# Patient Record
Sex: FEMALE | Race: WHITE | NOT HISPANIC OR LATINO | URBAN - METROPOLITAN AREA
[De-identification: names, ages, dates, MRNs, and addresses within clinical notes are randomized per-mention and may not be internally consistent; named-entity substitution may affect disease eponyms.]

---

## 2019-10-16 ENCOUNTER — EMERGENCY (EMERGENCY)
Facility: HOSPITAL | Age: 69
LOS: 1 days | Discharge: HOME | End: 2019-10-16
Attending: EMERGENCY MEDICINE | Admitting: EMERGENCY MEDICINE
Payer: MEDICARE

## 2019-10-16 VITALS
RESPIRATION RATE: 16 BRPM | TEMPERATURE: 98 F | HEART RATE: 75 BPM | DIASTOLIC BLOOD PRESSURE: 75 MMHG | OXYGEN SATURATION: 100 % | SYSTOLIC BLOOD PRESSURE: 126 MMHG

## 2019-10-16 PROCEDURE — 99283 EMERGENCY DEPT VISIT LOW MDM: CPT

## 2019-10-16 RX ADMIN — Medication 200 MILLIGRAM(S): at 13:18

## 2019-10-16 NOTE — ED ADULT TRIAGE NOTE - CHIEF COMPLAINT QUOTE
pt c/o tick bite to left chest area. states she removed the tick this morning. denies HA/fever/chills. denies pain and discomfort.

## 2019-10-16 NOTE — ED PROVIDER NOTE - PATIENT PORTAL LINK FT
You can access the FollowMyHealth Patient Portal offered by [facility] by registering at the following website: [email or web address]. By joining [employer]’s FollowMyHealth portal, you will also be able to view your health information using other applications (apps) compatible with our system.

## 2019-10-16 NOTE — ED PROVIDER NOTE - OBJECTIVE STATEMENT
Pt is a 68 y/o F with PMHx of HTN who presents to the ED c/o of a tick bite. She states she pulled a tick off her left axillary/upper breast area earlier this morning. Pt believes the tick was brought in 2 days ago by her dog. JOANA.

## 2019-10-16 NOTE — ED PROVIDER NOTE - CLINICAL SUMMARY MEDICAL DECISION MAKING FREE TEXT BOX
Pt is a 68 y/o F who presents to the ED with a tick bite to her left lateral pectoral area. Plan for 200 mg dose of doxycycline and Lyme prophylaxis. Pt is a 70 y/o F who presents to the ED with a tick bite to her left lateral pectoral area. Plan for 200 mg dose of doxycycline for Lyme prophylaxis.

## 2021-11-20 ENCOUNTER — EMERGENCY (EMERGENCY)
Facility: HOSPITAL | Age: 71
LOS: 1 days | Discharge: HOME | End: 2021-11-20
Attending: EMERGENCY MEDICINE | Admitting: EMERGENCY MEDICINE
Payer: MEDICARE

## 2021-11-20 VITALS
TEMPERATURE: 97 F | HEART RATE: 79 BPM | RESPIRATION RATE: 16 BRPM | HEIGHT: 66 IN | SYSTOLIC BLOOD PRESSURE: 166 MMHG | OXYGEN SATURATION: 100 % | DIASTOLIC BLOOD PRESSURE: 78 MMHG

## 2021-11-20 PROCEDURE — 73610 X-RAY EXAM OF ANKLE: CPT | Mod: 26,LT

## 2021-11-20 PROCEDURE — 99284 EMERGENCY DEPT VISIT MOD MDM: CPT

## 2021-11-20 PROCEDURE — 73630 X-RAY EXAM OF FOOT: CPT | Mod: 26,LT

## 2021-11-20 RX ORDER — IBUPROFEN 200 MG
600 TABLET ORAL ONCE
Refills: 0 | Status: COMPLETED | OUTPATIENT
Start: 2021-11-20 | End: 2021-11-20

## 2021-11-20 RX ORDER — ACETAMINOPHEN 500 MG
650 TABLET ORAL ONCE
Refills: 0 | Status: COMPLETED | OUTPATIENT
Start: 2021-11-20 | End: 2021-11-20

## 2021-11-20 RX ADMIN — Medication 650 MILLIGRAM(S): at 21:16

## 2021-11-20 RX ADMIN — Medication 600 MILLIGRAM(S): at 21:25

## 2021-11-20 NOTE — ED PROVIDER NOTE - PATIENT PORTAL LINK FT
You can access the FollowMyHealth Patient Portal offered by [facility] by registering at the following website: [email or web address]. By joining [employer]’s FollowMyHealth portal, you will also be able to view your health information using other applications (apps) compatible with our system.

## 2021-11-20 NOTE — ED PROVIDER NOTE - NSFOLLOWUPINSTRUCTIONS_ED_ALL_ED_FT
Your diagnosis:    Discharge instructions:    - Please follow up with your Primary Care Doctor within the next week.    - We recommend you stay off your feet to reduce inflammation and when you are sitting/laying down, we recommend elevating and icing your toe/foot.     - Tylenol up to 650 mg every 8 hours as needed for pain and/or Motrin up to 600 mg every 6 hours as needed for pain. Take any prescribed medications as instructed:       SEEK IMMEDIATE MEDICAL CARE IF YOU HAVE ANY OF THE FOLLOWING SYMPTOMS: numbness, tingling, increasing pain, or weakness in any part of the foot.

## 2021-11-20 NOTE — ED PROVIDER NOTE - OBJECTIVE STATEMENT
72yo F coming in after a fall. Patient was picking up large box of dog food and spun too quickly, causing patient to lose balance. Patient did not feel dizzy, lightheaded, diaphoretic or had associated chest pain or SOB prior to or after the event. Denied LOC or head injury. Was able to ambulate immediately afterward + walked her dog. Took 1g tylenol at 2pm. Coming in for persistent left toe pain + swelling.

## 2021-11-20 NOTE — ED PROVIDER NOTE - PHYSICAL EXAMINATION
GENERAL: well appearing in no acute distress, non-toxic appearing  HEAD: normocephalic, atraumatic  HENT: airway intact, neck supple  EYES: normal conjunctiva  CARDIAC: regular rate and rhythm, normal S1S2, no appreciable murmurs, 2+ pulses in UE/LE b/l  PULM: normal breath sounds, clear to ascultation bilaterally, no rales, rhonchi, wheezing  GI: abdomen nondistended, soft, nontender, no guarding, rebound tenderness  NEURO: no focal motor or sensory deficits  MSK: left foot swollen w/ ecchymoses over left toe; neurovascularly intact b/l; FROM left foot + toes.   SKIN: well-perfused, extremities warm, no visible rashes

## 2021-11-20 NOTE — ED ADULT TRIAGE NOTE - CHIEF COMPLAINT QUOTE
Pt states that she was lifting a 30lb bag of dog food  and ad a syncopal episode, pt c/o pain to left foot as a result of falling to the ground.  Pt denies head strike.  PMh hashimotos

## 2021-11-20 NOTE — ED PROVIDER NOTE - CLINICAL SUMMARY MEDICAL DECISION MAKING FREE TEXT BOX
72yo F coming in after a fall; no concern for syncope given nature of event. Will screen for fracture Ani att: 72 yo F coming in after a fall; no concern for syncope given nature of event. Will screen for fracture

## 2021-11-20 NOTE — ED ADULT NURSE NOTE - OBJECTIVE STATEMENT
Pt states that she was lifting a 30lb bag of dog food  and ad a syncopal episode, pt c/o pain to left foot as a result of falling to the ground.  Pt denies head strike.  PMh hashimotos  Patient brought to room 26. Pt is alert and oriented times three. Pt is waiting for evaluation by MD. Dana RN

## 2021-11-20 NOTE — ED PROVIDER NOTE - NS ED ROS FT
General: denies fever, chills  HENT: denies nasal congestion, rhinorrhea  Eyes: denies visual changes, blurred vision  CV: denies chest pain, palpitations  Resp: denies difficulty breathing, cough  Abdominal: denies nausea, vomiting, diarrhea, abdominal pain  : denies urinary pain or discharge  MSK: left foot pain + toe pain  Neuro: denies headaches, numbness, tingling  Skin: denies rashes, bruises

## 2023-07-12 ENCOUNTER — EMERGENCY (EMERGENCY)
Facility: HOSPITAL | Age: 73
LOS: 1 days | Discharge: HOME | End: 2023-07-12
Attending: EMERGENCY MEDICINE | Admitting: EMERGENCY MEDICINE
Payer: MEDICARE

## 2023-07-12 VITALS
RESPIRATION RATE: 16 BRPM | HEART RATE: 79 BPM | TEMPERATURE: 99 F | SYSTOLIC BLOOD PRESSURE: 149 MMHG | OXYGEN SATURATION: 100 % | DIASTOLIC BLOOD PRESSURE: 82 MMHG

## 2023-07-12 VITALS
OXYGEN SATURATION: 97 % | HEART RATE: 71 BPM | RESPIRATION RATE: 17 BRPM | SYSTOLIC BLOOD PRESSURE: 146 MMHG | DIASTOLIC BLOOD PRESSURE: 87 MMHG | TEMPERATURE: 98 F

## 2023-07-12 LAB
ALBUMIN SERPL ELPH-MCNC: 4.6 G/DL — SIGNIFICANT CHANGE UP (ref 3.3–5)
ALP SERPL-CCNC: 62 U/L — SIGNIFICANT CHANGE UP (ref 40–120)
ALT FLD-CCNC: 23 U/L — SIGNIFICANT CHANGE UP (ref 4–33)
ANION GAP SERPL CALC-SCNC: 10 MMOL/L — SIGNIFICANT CHANGE UP (ref 7–14)
APPEARANCE UR: CLEAR — SIGNIFICANT CHANGE UP
APTT BLD: 35.9 SEC — SIGNIFICANT CHANGE UP (ref 27–36.3)
AST SERPL-CCNC: 19 U/L — SIGNIFICANT CHANGE UP (ref 4–32)
BACTERIA # UR AUTO: NEGATIVE /HPF — SIGNIFICANT CHANGE UP
BASE EXCESS BLDV CALC-SCNC: 4.1 MMOL/L — HIGH (ref -2–3)
BASOPHILS # BLD AUTO: 0.02 K/UL — SIGNIFICANT CHANGE UP (ref 0–0.2)
BASOPHILS NFR BLD AUTO: 0.3 % — SIGNIFICANT CHANGE UP (ref 0–2)
BILIRUB SERPL-MCNC: 0.6 MG/DL — SIGNIFICANT CHANGE UP (ref 0.2–1.2)
BILIRUB UR-MCNC: NEGATIVE — SIGNIFICANT CHANGE UP
BLOOD GAS VENOUS COMPREHENSIVE RESULT: SIGNIFICANT CHANGE UP
BUN SERPL-MCNC: 18 MG/DL — SIGNIFICANT CHANGE UP (ref 7–23)
CALCIUM SERPL-MCNC: 9.6 MG/DL — SIGNIFICANT CHANGE UP (ref 8.4–10.5)
CAST: 0 /LPF — SIGNIFICANT CHANGE UP (ref 0–4)
CHLORIDE BLDV-SCNC: 103 MMOL/L — SIGNIFICANT CHANGE UP (ref 96–108)
CHLORIDE SERPL-SCNC: 103 MMOL/L — SIGNIFICANT CHANGE UP (ref 98–107)
CO2 BLDV-SCNC: 31.8 MMOL/L — HIGH (ref 22–26)
CO2 SERPL-SCNC: 26 MMOL/L — SIGNIFICANT CHANGE UP (ref 22–31)
COLOR SPEC: YELLOW — SIGNIFICANT CHANGE UP
CREAT SERPL-MCNC: 0.79 MG/DL — SIGNIFICANT CHANGE UP (ref 0.5–1.3)
DIFF PNL FLD: NEGATIVE — SIGNIFICANT CHANGE UP
EGFR: 78 ML/MIN/1.73M2 — SIGNIFICANT CHANGE UP
EOSINOPHIL # BLD AUTO: 0.19 K/UL — SIGNIFICANT CHANGE UP (ref 0–0.5)
EOSINOPHIL NFR BLD AUTO: 2.9 % — SIGNIFICANT CHANGE UP (ref 0–6)
GAS PNL BLDV: 134 MMOL/L — LOW (ref 136–145)
GAS PNL BLDV: SIGNIFICANT CHANGE UP
GLUCOSE BLDV-MCNC: 96 MG/DL — SIGNIFICANT CHANGE UP (ref 70–99)
GLUCOSE SERPL-MCNC: 131 MG/DL — HIGH (ref 70–99)
GLUCOSE UR QL: >=1000 MG/DL
HCO3 BLDV-SCNC: 30 MMOL/L — HIGH (ref 22–29)
HCT VFR BLD CALC: 45.1 % — HIGH (ref 34.5–45)
HCT VFR BLDA CALC: 44 % — SIGNIFICANT CHANGE UP (ref 34.5–46.5)
HGB BLD CALC-MCNC: 14.6 G/DL — SIGNIFICANT CHANGE UP (ref 11.7–16.1)
HGB BLD-MCNC: 14.3 G/DL — SIGNIFICANT CHANGE UP (ref 11.5–15.5)
IANC: 4.28 K/UL — SIGNIFICANT CHANGE UP (ref 1.8–7.4)
IMM GRANULOCYTES NFR BLD AUTO: 0.3 % — SIGNIFICANT CHANGE UP (ref 0–0.9)
INR BLD: 1 RATIO — SIGNIFICANT CHANGE UP (ref 0.88–1.16)
KETONES UR-MCNC: NEGATIVE MG/DL — SIGNIFICANT CHANGE UP
LACTATE BLDV-MCNC: 1.2 MMOL/L — SIGNIFICANT CHANGE UP (ref 0.5–2)
LEUKOCYTE ESTERASE UR-ACNC: ABNORMAL
LYMPHOCYTES # BLD AUTO: 1.69 K/UL — SIGNIFICANT CHANGE UP (ref 1–3.3)
LYMPHOCYTES # BLD AUTO: 25.6 % — SIGNIFICANT CHANGE UP (ref 13–44)
MCHC RBC-ENTMCNC: 28.9 PG — SIGNIFICANT CHANGE UP (ref 27–34)
MCHC RBC-ENTMCNC: 31.7 GM/DL — LOW (ref 32–36)
MCV RBC AUTO: 91.3 FL — SIGNIFICANT CHANGE UP (ref 80–100)
MONOCYTES # BLD AUTO: 0.41 K/UL — SIGNIFICANT CHANGE UP (ref 0–0.9)
MONOCYTES NFR BLD AUTO: 6.2 % — SIGNIFICANT CHANGE UP (ref 2–14)
NEUTROPHILS # BLD AUTO: 4.28 K/UL — SIGNIFICANT CHANGE UP (ref 1.8–7.4)
NEUTROPHILS NFR BLD AUTO: 64.7 % — SIGNIFICANT CHANGE UP (ref 43–77)
NITRITE UR-MCNC: NEGATIVE — SIGNIFICANT CHANGE UP
NRBC # BLD: 0 /100 WBCS — SIGNIFICANT CHANGE UP (ref 0–0)
NRBC # FLD: 0 K/UL — SIGNIFICANT CHANGE UP (ref 0–0)
PCO2 BLDV: 50 MMHG — SIGNIFICANT CHANGE UP (ref 39–52)
PH BLDV: 7.39 — SIGNIFICANT CHANGE UP (ref 7.32–7.43)
PH UR: 6 — SIGNIFICANT CHANGE UP (ref 5–8)
PLATELET # BLD AUTO: 278 K/UL — SIGNIFICANT CHANGE UP (ref 150–400)
PO2 BLDV: <20 MMHG — LOW (ref 25–45)
POTASSIUM BLDV-SCNC: 4.3 MMOL/L — SIGNIFICANT CHANGE UP (ref 3.5–5.1)
POTASSIUM SERPL-MCNC: 4 MMOL/L — SIGNIFICANT CHANGE UP (ref 3.5–5.3)
POTASSIUM SERPL-SCNC: 4 MMOL/L — SIGNIFICANT CHANGE UP (ref 3.5–5.3)
PROT SERPL-MCNC: 7.5 G/DL — SIGNIFICANT CHANGE UP (ref 6–8.3)
PROT UR-MCNC: NEGATIVE MG/DL — SIGNIFICANT CHANGE UP
PROTHROM AB SERPL-ACNC: 11.6 SEC — SIGNIFICANT CHANGE UP (ref 10.5–13.4)
RBC # BLD: 4.94 M/UL — SIGNIFICANT CHANGE UP (ref 3.8–5.2)
RBC # FLD: 12.3 % — SIGNIFICANT CHANGE UP (ref 10.3–14.5)
RBC CASTS # UR COMP ASSIST: 0 /HPF — SIGNIFICANT CHANGE UP (ref 0–4)
SAO2 % BLDV: 15.7 % — LOW (ref 67–88)
SODIUM SERPL-SCNC: 139 MMOL/L — SIGNIFICANT CHANGE UP (ref 135–145)
SP GR SPEC: 1.05 — HIGH (ref 1–1.03)
SQUAMOUS # UR AUTO: 1 /HPF — SIGNIFICANT CHANGE UP (ref 0–5)
TROPONIN T, HIGH SENSITIVITY RESULT: 8 NG/L — SIGNIFICANT CHANGE UP
TROPONIN T, HIGH SENSITIVITY RESULT: 8 NG/L — SIGNIFICANT CHANGE UP
UROBILINOGEN FLD QL: 0.2 MG/DL — SIGNIFICANT CHANGE UP (ref 0.2–1)
WBC # BLD: 6.61 K/UL — SIGNIFICANT CHANGE UP (ref 3.8–10.5)
WBC # FLD AUTO: 6.61 K/UL — SIGNIFICANT CHANGE UP (ref 3.8–10.5)
WBC UR QL: 14 /HPF — HIGH (ref 0–5)

## 2023-07-12 PROCEDURE — 71045 X-RAY EXAM CHEST 1 VIEW: CPT | Mod: 26

## 2023-07-12 PROCEDURE — 0042T: CPT

## 2023-07-12 PROCEDURE — 99285 EMERGENCY DEPT VISIT HI MDM: CPT

## 2023-07-12 PROCEDURE — 70496 CT ANGIOGRAPHY HEAD: CPT | Mod: 26,MA

## 2023-07-12 PROCEDURE — 70498 CT ANGIOGRAPHY NECK: CPT | Mod: 26,MA

## 2023-07-12 PROCEDURE — 93010 ELECTROCARDIOGRAM REPORT: CPT

## 2023-07-12 NOTE — ED ADULT NURSE NOTE - OBJECTIVE STATEMENT
patient is awake and alert, states having a left sided facial droop this morning that has since resolved.  Patient was a code stroke, Passes dysphagia screening.  No weakness or slurred speech noted.

## 2023-07-12 NOTE — ED PROVIDER NOTE - CARDIAC, MLM
Problem: PAIN - ADULT  Goal: Verbalizes/displays adequate comfort level or baseline comfort level  Description: Interventions:  - Encourage patient to monitor pain and request assistance  - Assess pain using appropriate pain scale  - Administer analgesics based on type and severity of pain and evaluate response  - Implement non-pharmacological measures as appropriate and evaluate response  - Consider cultural and social influences on pain and pain management  - Notify physician/advanced practitioner if interventions unsuccessful or patient reports new pain  Outcome: Progressing     Problem: SAFETY ADULT  Goal: Patient will remain free of falls  Description: INTERVENTIONS:  - Educate patient/family on patient safety including physical limitations  - Instruct patient to call for assistance with activity   - Consult OT/PT to assist with strengthening/mobility   - Keep Call bell within reach  - Keep bed low and locked with side rails adjusted as appropriate  - Keep care items and personal belongings within reach  - Initiate and maintain comfort rounds    - Apply yellow socks and bracelet for high fall risk patients  - Consider moving patient to room near nurses station  Outcome: Progressing     Problem: DISCHARGE PLANNING  Goal: Discharge to home or other facility with appropriate resources  Description: INTERVENTIONS:  - Identify barriers to discharge w/patient and caregiver  - Arrange for needed discharge resources and transportation as appropriate  - Identify discharge learning needs (meds, wound care, etc )  - Arrange for interpretive services to assist at discharge as needed  - Refer to Case Management Department for coordinating discharge planning if the patient needs post-hospital services based on physician/advanced practitioner order or complex needs related to functional status, cognitive ability, or social support system  Outcome: Progressing     Problem: Knowledge Deficit  Goal: Patient/family/caregiver demonstrates understanding of disease process, treatment plan, medications, and discharge instructions  Description: Complete learning assessment and assess knowledge base    Interventions:  - Provide teaching at level of understanding  - Provide teaching via preferred learning methods  Outcome: Progressing Normal rate, regular rhythm.  Heart sounds S1, S2.  No murmurs, rubs or gallops.

## 2023-07-12 NOTE — ED PROVIDER NOTE - PATIENT PORTAL LINK FT
You can access the FollowMyHealth Patient Portal offered by [facility] by registering at the following website: [email or web address]. By joining [employer]’s FollowMyHealth portal, you will also be able to view your health information using other applications (apps) compatible with our system.

## 2023-07-12 NOTE — ED PROVIDER NOTE - OBJECTIVE STATEMENT
73 year old female with PMH of HTN, DM, migraines presents to ED complaining of left sided facial heaviness and dizziness which she describes as spinning sensation upon awakening this morning. Last known normal 10:30pm last night. Code stroke activated at triage. She reports associated blurry vision and ataxia. Symptoms lasted roughly an hour; but she states she now feels better, and at this time she denies any, dizziness, blurry vision, weakness, numbness or tinglings sensation, chest pain, dyspnea, or any other associated complaints. Has a hx of migraine headaches.

## 2023-07-12 NOTE — ED ADULT TRIAGE NOTE - CHIEF COMPLAINT QUOTE
Pt. states she woke up around 5:30am and noticed left sided facial droop. Went to walk her dog and felt dizzy and flickering of lights in her eyes. Symptoms improving since but still has slight droop. No slurred speech or drift noted. Sent by her PCP to r/o CVA. h/o HTN, DM

## 2023-07-12 NOTE — ED PROVIDER NOTE - PROGRESS NOTE DETAILS
MATT Lehman: CT imagining studies all unremarkable. Neuro recs appreciated. CDU considered for MRI, but pt states she is unable to be admitted for further work-up as she has sick cousin at home for whom she needs to care for.   The pt is clinically sober, AA&Ox3, free from distracting injury.  Throughout our interactions in the ED today, the pt has demonstrated concrete thinking/reasoning, has maintained an orderly/reasonable conversation, appears to have intact insight/judgment/reason and therefore in our opinion has capacity to make decisions.  Given the pt’s presentation, we communicated our concern for stroke.  The pt verbalized an understanding of our worries. We’ve told the patient that the ED evaluation is incomplete & many troublesome conditions haven’t been r/o.   We have discussed the need for further ED w/u so we can get more information about her symptoms.  We have discussed the range of possible dx, potential testing & tx options.  We’ve made  numerous efforts to prevent the pt from leaving AMA.  Our discussions included the potential outcomes, possible worsening of their condition, becoming permanently disabled/in pain/critically ill, or death.  Despite these efforts, we were unable to convince the pt to stay.  The pt is refusing any  further care and is leaving against medical advice. We have attempted to offer tx/rx/guidance for any dangerous conditions which are most likely and/or dangerous.  We have answered all questions and have implored the pt to return ASAP to complete the w/u. MATT Lehman: CT imagining studies all unremarkable. Neuro recs appreciated. CDU considered for MRI, but pt states she is unable to be admitted for further work-up as she has sick  at home for whom she needs to care for.   The pt is clinically sober, AA&Ox3, free from distracting injury.  Throughout our interactions in the ED today, the pt has demonstrated concrete thinking/reasoning, has maintained an orderly/reasonable conversation, appears to have intact insight/judgment/reason and therefore in our opinion has capacity to make decisions.  Given the pt’s presentation, we communicated our concern for stroke.  The pt verbalized an understanding of our worries. We’ve told the patient that the ED evaluation is incomplete & many troublesome conditions haven’t been r/o.   We have discussed the need for further ED w/u so we can get more information about her symptoms.  We have discussed the range of possible dx, potential testing & tx options.  We’ve made  numerous efforts to prevent the pt from leaving AMA.  Our discussions included the potential outcomes, possible worsening of their condition, becoming permanently disabled/in pain/critically ill, or death.  Despite these efforts, we were unable to convince the pt to stay.  The pt is refusing any  further care and is leaving against medical advice. We have attempted to offer tx/rx/guidance for any dangerous conditions which are most likely and/or dangerous.  We have answered all questions and have implored the pt to return ASAP to complete the w/u.

## 2023-07-12 NOTE — CONSULT NOTE ADULT - ASSESSMENT
Please see ENT on Friday at 10am wound recheck at [facility] [place] as given information HPI: Patient MAY SORIANO is a 73y (1950) wo/man with a PMHx significant for HTN, DM, migraines presents to ED as a code stroke for L face heaviness and vertigo. LKW 10:30 PM on 7/11/23 when pt went to bed. Pt states she woke up this AM with L face heaviness (for unclear duration) and then around 7:30 AM pt had experienced vertigo and generalized HA (lasted for 1/2 hour), with associated zigzag lines in vision, while walking in the park. In total duration symptoms were over an hour. Pt is currently asymptomatic. No prior hx of such symptoms. States she has a hx of migraines with assoc. photophobia/phonophobia but no other symptoms. PT is not a tenecteplase/thrombectomy candidate as pt is back to baseline.     NIH 0   preMRS 0  ABCD 7    Nonfocal exam    Impression: Transient L facial heaviness, vertigo possibly 2/2 TIA. vs other etiology. May consider MR Brain to rule out stroke.     Recommendations:   Imaging:   -F/U CT/CTA/CTP  -MRI brain w/o contrast, if no contraindications  -TTE  -Can consider STAT CT head non-contrast for change in neuro exam    Secondary prevention of stroke:  -Permissive HTN up to 220/110 for 24 hours from symptom onset, gradual normotension over 2-3 days  -81 mg ASA daily and 75 mg plavix daily, if no contraindications  -Atorvastatin 80 mg daily (long-term goal LDL < 70)  -Tight glucose control (long-term goal HgbA1c < 6%)    Misc/additional recs:   -Neuro checks Q4  -Dysphagia screen  -Speech and swallow eval if dysphagia screen fails  -NPO except meds until dysphagia screen passed  -Head of bed > 30 degrees for aspiration prevention and aspiration precautions ordered.  -Fall precautions, aspiration precautions  -Continuous  telemetry to monitor for arrhythmia  -Stroke labwork: HgbA1C, fasting lipid panel, CBC, CMP, coag pannel, troponin  -Baseline EKG  -PT/OT  -DVT Prophylaxis: Lovenox 40 mg Sq daily unless contraindicated in which case SCDs     To be discussed with neurology attending and will be formally staffed by attending during morning rounds. Recommendations will be finalized once signed by attending.

## 2023-07-12 NOTE — ED PROVIDER NOTE - CLINICAL SUMMARY MEDICAL DECISION MAKING FREE TEXT BOX
73 year old female with PMH of HTN, DM, migraines presents to ED complaining of left sided facial heaviness and dizziness which she describes as spinning sensation upon awakening this morning. HD stable. Imp: TIA, r/o CVA. Plan for labs and imaging studies per stroke protocol. neuro recs appreciated.

## 2023-07-12 NOTE — ED PROVIDER NOTE - ATTENDING APP SHARED VISIT CONTRIBUTION OF CARE
Stroke code called due to reports of left facial droop that lasted half hour and vertigo and unsteady gait earlier today.  Patient well-appearing no acute distress HEENT unremarkable pupils equal round and reactive to light,, no obvious facial droop, heart sounds S1-S2 lungs clear abdomen soft nontender extremities no edema.  Gait normal. Stroke code called due to reports of left facial droop that lasted half hour and vertigo and unsteady gait earlier today.  Patient well-appearing no acute distress HEENT unremarkable pupils equal round and reactive to light,, no obvious facial droop, heart sounds S1-S2 lungs clear abdomen soft nontender extremities no edema.  Gait normal

## 2023-07-12 NOTE — ED ADULT NURSE NOTE - NSFALLUNIVINTERV_ED_ALL_ED
Bed/Stretcher in lowest position, wheels locked, appropriate side rails in place/Call bell, personal items and telephone in reach/Instruct patient to call for assistance before getting out of bed/chair/stretcher/Non-slip footwear applied when patient is off stretcher/[place] to call system/Physically safe environment - no spills, clutter or unnecessary equipment/Purposeful proactive rounding/Room/bathroom lighting operational, light cord in reach

## 2023-07-12 NOTE — CONSULT NOTE ADULT - SUBJECTIVE AND OBJECTIVE BOX
Last culture not definite uti.  Should have an office visit if this culture is negative as well   Neurology - Consult Note    -  Spectra: [phone] ([facility]), [phone] ([facility])  -    HPI: Patient MAY SORIANO is a 73y (1950) wo/man with a PMHx significant for HTN, DM, migraines presents to ED as a code stroke for L face heaviness and vertigo. LKW 10:30 PM on 7/11/23 when pt went to bed. Pt states she woke up this AM with L face heaviness (for unclear duration) and then around 7:30 AM pt had experienced vertigo and generalized HA (lasted for 1/2 hour), with associated zigzag lines in vision, while walking in the park. Pt is currently asymptomatic. No prior hx of such symptoms. States she has a hx of migraines with assoc. photophobia/phonophobia but no other symptoms. PT is not a tenecteplase/thrombectomy candidate as pt is back to baseline.     NIH 0   preMRS 0        Review of Systems:   CONSTITUTIONAL: No fevers or chills  EYES AND ENT: No visual changes or no throat pain   RESPIRATORY: No hemoptysis or shortness of breath  NEUROLOGICAL: +As stated in HPI above  SKIN: No itching, burning, rashes, or lesions   All other review of systems is negative unless indicated above.    Allergies:  No Known Allergies      PMHx/PSHx/Family Hx: As above, otherwise see below   Hypertension        Social Hx:  No current use of tobacco, alcohol, or illicit drugs    Medications:  MEDICATIONS  (STANDING):    MEDICATIONS  (PRN):      Vitals:  T(C): 37.1 (07-12-23 @ 12:01), Max: 37.1 (07-12-23 @ 12:01)  HR: 79 (07-12-23 @ 12:01) (79 - 79)  BP: 149/82 (07-12-23 @ 12:01) (149/82 - 149/82)  RR: 16 (07-12-23 @ 12:01) (16 - 16)  SpO2: 100% (07-12-23 @ 12:01) (100% - 100%)    Physical Examination:   General - NAD  Cardiovascular - Peripheral pulses palpable, no edema  Eyes -Fundoscopy not performed due to safety precautions in the setting of the COVID-19 pandemic    Neurologic Exam:  Mental status - Awake, Alert, Oriented to person, place, and time. Speech fluent, repetition and naming intact. Follows simple and complex commands. Attention/concentration, recent and remote memory (including registration and recall), and fund of knowledge intact    Cranial nerves - PERRLA, VFF, EOMI, face sensation (V1-V3) intact b/l, facial strength intact without asymmetry b/l, hearing intact b/l, palate with symmetric elevation, sternocleidomastiod 5/5 strength b/l, tongue midline on protrusion with full lateral movement    Motor - Normal bulk and tone throughout. No pronator drift.  Strength testing            Deltoid      Biceps      Triceps     Wrist Extension    Wrist Flexion     Interossei         R            5                 5               5                     5                              5                        5                 5  L             5                 5               5                     5                              5                        5                 5              Hip Flexion    Hip Extension    Knee Flexion    Knee Extension    Dorsiflexion    Plantar Flexion  R              5                           5                       5                           5                            5                          5  L              5                           5                        5                           5                            5                          5    No drift in extremities.     Sensation - Light touch/temperature intact throughout    Coordination - Finger to Nose intact b/l. No tremors appreciated    Gait and station - Normal casual gait. Romberg (-)    Labs:          CAPILLARY BLOOD GLUCOSE      POCT Blood Glucose.: 117 mg/dL (12 Jul 2023 12:05)          CSF:                  Radiology:

## 2023-07-19 ENCOUNTER — INPATIENT (INPATIENT)
Facility: HOSPITAL | Age: 73
LOS: 3 days | Discharge: HOME | End: 2023-07-23
Attending: STUDENT IN AN ORGANIZED HEALTH CARE EDUCATION/TRAINING PROGRAM | Admitting: STUDENT IN AN ORGANIZED HEALTH CARE EDUCATION/TRAINING PROGRAM
Payer: MEDICARE

## 2023-07-19 VITALS
OXYGEN SATURATION: 100 % | DIASTOLIC BLOOD PRESSURE: 94 MMHG | HEART RATE: 74 BPM | RESPIRATION RATE: 16 BRPM | SYSTOLIC BLOOD PRESSURE: 148 MMHG | TEMPERATURE: 98 F

## 2023-07-19 DIAGNOSIS — I63.9 CEREBRAL INFARCTION, UNSPECIFIED: ICD-10-CM

## 2023-07-19 LAB
ALBUMIN SERPL ELPH-MCNC: 4.7 G/DL — SIGNIFICANT CHANGE UP (ref 3.3–5)
ALP SERPL-CCNC: 63 U/L — SIGNIFICANT CHANGE UP (ref 40–120)
ALT FLD-CCNC: 27 U/L — SIGNIFICANT CHANGE UP (ref 4–33)
ANION GAP SERPL CALC-SCNC: 12 MMOL/L — SIGNIFICANT CHANGE UP (ref 7–14)
APPEARANCE UR: CLEAR — SIGNIFICANT CHANGE UP
APTT BLD: 34.3 SEC — SIGNIFICANT CHANGE UP (ref 27–36.3)
AST SERPL-CCNC: 22 U/L — SIGNIFICANT CHANGE UP (ref 4–32)
BACTERIA # UR AUTO: ABNORMAL /HPF
BASOPHILS # BLD AUTO: 0.03 K/UL — SIGNIFICANT CHANGE UP (ref 0–0.2)
BASOPHILS NFR BLD AUTO: 0.4 % — SIGNIFICANT CHANGE UP (ref 0–2)
BILIRUB SERPL-MCNC: 0.4 MG/DL — SIGNIFICANT CHANGE UP (ref 0.2–1.2)
BILIRUB UR-MCNC: NEGATIVE — SIGNIFICANT CHANGE UP
BLD GP AB SCN SERPL QL: NEGATIVE — SIGNIFICANT CHANGE UP
BUN SERPL-MCNC: 20 MG/DL — SIGNIFICANT CHANGE UP (ref 7–23)
CALCIUM SERPL-MCNC: 9.9 MG/DL — SIGNIFICANT CHANGE UP (ref 8.4–10.5)
CHLORIDE SERPL-SCNC: 101 MMOL/L — SIGNIFICANT CHANGE UP (ref 98–107)
CO2 SERPL-SCNC: 26 MMOL/L — SIGNIFICANT CHANGE UP (ref 22–31)
COLOR SPEC: YELLOW — SIGNIFICANT CHANGE UP
CREAT SERPL-MCNC: 0.83 MG/DL — SIGNIFICANT CHANGE UP (ref 0.5–1.3)
DIFF PNL FLD: NEGATIVE — SIGNIFICANT CHANGE UP
EGFR: 74 ML/MIN/1.73M2 — SIGNIFICANT CHANGE UP
EOSINOPHIL # BLD AUTO: 0.22 K/UL — SIGNIFICANT CHANGE UP (ref 0–0.5)
EOSINOPHIL NFR BLD AUTO: 2.8 % — SIGNIFICANT CHANGE UP (ref 0–6)
GLUCOSE SERPL-MCNC: 116 MG/DL — HIGH (ref 70–99)
GLUCOSE UR QL: 500 MG/DL
HCT VFR BLD CALC: 42.8 % — SIGNIFICANT CHANGE UP (ref 34.5–45)
HGB BLD-MCNC: 13.8 G/DL — SIGNIFICANT CHANGE UP (ref 11.5–15.5)
IANC: 4.35 K/UL — SIGNIFICANT CHANGE UP (ref 1.8–7.4)
IMM GRANULOCYTES NFR BLD AUTO: 0.3 % — SIGNIFICANT CHANGE UP (ref 0–0.9)
INR BLD: 0.94 RATIO — SIGNIFICANT CHANGE UP (ref 0.88–1.16)
KETONES UR-MCNC: NEGATIVE MG/DL — SIGNIFICANT CHANGE UP
LEUKOCYTE ESTERASE UR-ACNC: ABNORMAL
LYMPHOCYTES # BLD AUTO: 2.61 K/UL — SIGNIFICANT CHANGE UP (ref 1–3.3)
LYMPHOCYTES # BLD AUTO: 33.7 % — SIGNIFICANT CHANGE UP (ref 13–44)
MCHC RBC-ENTMCNC: 29.4 PG — SIGNIFICANT CHANGE UP (ref 27–34)
MCHC RBC-ENTMCNC: 32.2 GM/DL — SIGNIFICANT CHANGE UP (ref 32–36)
MCV RBC AUTO: 91.1 FL — SIGNIFICANT CHANGE UP (ref 80–100)
MONOCYTES # BLD AUTO: 0.52 K/UL — SIGNIFICANT CHANGE UP (ref 0–0.9)
MONOCYTES NFR BLD AUTO: 6.7 % — SIGNIFICANT CHANGE UP (ref 2–14)
NEUTROPHILS # BLD AUTO: 4.35 K/UL — SIGNIFICANT CHANGE UP (ref 1.8–7.4)
NEUTROPHILS NFR BLD AUTO: 56.1 % — SIGNIFICANT CHANGE UP (ref 43–77)
NITRITE UR-MCNC: NEGATIVE — SIGNIFICANT CHANGE UP
NRBC # BLD: 0 /100 WBCS — SIGNIFICANT CHANGE UP (ref 0–0)
NRBC # FLD: 0 K/UL — SIGNIFICANT CHANGE UP (ref 0–0)
PH UR: 6.5 — SIGNIFICANT CHANGE UP (ref 5–8)
PLATELET # BLD AUTO: 281 K/UL — SIGNIFICANT CHANGE UP (ref 150–400)
POTASSIUM SERPL-MCNC: 4.3 MMOL/L — SIGNIFICANT CHANGE UP (ref 3.5–5.3)
POTASSIUM SERPL-SCNC: 4.3 MMOL/L — SIGNIFICANT CHANGE UP (ref 3.5–5.3)
PROT SERPL-MCNC: 7.5 G/DL — SIGNIFICANT CHANGE UP (ref 6–8.3)
PROT UR-MCNC: NEGATIVE MG/DL — SIGNIFICANT CHANGE UP
PROTHROM AB SERPL-ACNC: 10.9 SEC — SIGNIFICANT CHANGE UP (ref 10.5–13.4)
RBC # BLD: 4.7 M/UL — SIGNIFICANT CHANGE UP (ref 3.8–5.2)
RBC # FLD: 12.7 % — SIGNIFICANT CHANGE UP (ref 10.3–14.5)
RBC CASTS # UR COMP ASSIST: SIGNIFICANT CHANGE UP /HPF (ref 0–4)
RH IG SCN BLD-IMP: POSITIVE — SIGNIFICANT CHANGE UP
SODIUM SERPL-SCNC: 139 MMOL/L — SIGNIFICANT CHANGE UP (ref 135–145)
SP GR SPEC: 1.05 — HIGH (ref 1–1.03)
TROPONIN T, HIGH SENSITIVITY RESULT: 8 NG/L — SIGNIFICANT CHANGE UP
UROBILINOGEN FLD QL: 0.2 MG/DL — SIGNIFICANT CHANGE UP (ref 0.2–1)
WBC # BLD: 7.75 K/UL — SIGNIFICANT CHANGE UP (ref 3.8–10.5)
WBC # FLD AUTO: 7.75 K/UL — SIGNIFICANT CHANGE UP (ref 3.8–10.5)
WBC UR QL: SIGNIFICANT CHANGE UP /HPF (ref 0–5)

## 2023-07-19 PROCEDURE — 0042T: CPT | Mod: MA

## 2023-07-19 PROCEDURE — 99291 CRITICAL CARE FIRST HOUR: CPT | Mod: GC

## 2023-07-19 PROCEDURE — 70498 CT ANGIOGRAPHY NECK: CPT | Mod: 26,MA

## 2023-07-19 PROCEDURE — 99285 EMERGENCY DEPT VISIT HI MDM: CPT

## 2023-07-19 PROCEDURE — 70450 CT HEAD/BRAIN W/O DYE: CPT | Mod: 26,MA,59

## 2023-07-19 PROCEDURE — 70496 CT ANGIOGRAPHY HEAD: CPT | Mod: 26,MA

## 2023-07-19 RX ORDER — LOSARTAN POTASSIUM 100 MG/1
1 TABLET, FILM COATED ORAL
Refills: 0 | DISCHARGE

## 2023-07-19 RX ORDER — ATORVASTATIN CALCIUM 80 MG/1
1 TABLET, FILM COATED ORAL
Refills: 0 | DISCHARGE

## 2023-07-19 RX ORDER — METOPROLOL TARTRATE 50 MG
0 TABLET ORAL
Refills: 0 | DISCHARGE

## 2023-07-19 RX ORDER — TENECTEPLASE 50 MG
19 KIT INTRAVENOUS ONCE
Refills: 0 | Status: COMPLETED | OUTPATIENT
Start: 2023-07-19 | End: 2023-07-19

## 2023-07-19 RX ORDER — METFORMIN HYDROCHLORIDE 850 MG/1
0 TABLET ORAL
Refills: 0 | DISCHARGE

## 2023-07-19 RX ORDER — SODIUM CHLORIDE 9 MG/ML
10 INJECTION INTRAMUSCULAR; INTRAVENOUS; SUBCUTANEOUS ONCE
Refills: 0 | Status: COMPLETED | OUTPATIENT
Start: 2023-07-19 | End: 2023-07-19

## 2023-07-19 RX ORDER — ASPIRIN/CALCIUM CARB/MAGNESIUM 324 MG
1 TABLET ORAL
Refills: 0 | DISCHARGE

## 2023-07-19 RX ORDER — ATORVASTATIN CALCIUM 80 MG/1
80 TABLET, FILM COATED ORAL AT BEDTIME
Refills: 0 | Status: DISCONTINUED | OUTPATIENT
Start: 2023-07-20 | End: 2023-07-20

## 2023-07-19 RX ORDER — CHLORHEXIDINE GLUCONATE 213 G/1000ML
1 SOLUTION TOPICAL
Refills: 0 | Status: DISCONTINUED | OUTPATIENT
Start: 2023-07-19 | End: 2023-07-23

## 2023-07-19 RX ORDER — ATORVASTATIN CALCIUM 80 MG/1
80 TABLET, FILM COATED ORAL AT BEDTIME
Refills: 0 | Status: DISCONTINUED | OUTPATIENT
Start: 2023-07-19 | End: 2023-07-19

## 2023-07-19 RX ADMIN — SODIUM CHLORIDE 10 MILLILITER(S): 9 INJECTION INTRAMUSCULAR; INTRAVENOUS; SUBCUTANEOUS at 17:40

## 2023-07-19 RX ADMIN — TENECTEPLASE 2736 MILLIGRAM(S): KIT at 17:40

## 2023-07-19 NOTE — ED ADULT NURSE REASSESSMENT NOTE - NS ED NURSE REASSESS COMMENT FT1
Patient with an episode of minimal bleeding from the gums, Dr. Negron called to bedside and made aware. Bleeding under control at this time. Safety maintained through out.
handoff report given to ED UZMA CHAUHAN pt aox4, reports decreased sensation no longer present. left hand remains weak, no drift noted at time of handoff.
Received report from stroke RN Velma, patient was given Tenecteplase at 1740. Q15 neuro checks started and in progress, see code stroke flow sheet. Pt sensation is intact and no drift noted. Pt atypical stroke symptoms presented at this time is weakness to left wrist and hand. Now able to feel touch.  not as strong as compared to right hand/arm. Pt A&OX4. Ambulatory at baseline. 20 G IVL to R AC is clean, dry, and intact. Safety maintained throughout.

## 2023-07-19 NOTE — ED ADULT NURSE REASSESSMENT NOTE - NSFALLRISKINTERV_ED_ALL_ED

## 2023-07-19 NOTE — PATIENT PROFILE ADULT - FALL HARM RISK - HARM RISK INTERVENTIONS
Assistance with ambulation/Assistance OOB with selected safe patient handling equipment/Communicate Risk of Fall with Harm to all staff/Discuss with provider need for PT consult/Monitor gait and stability/Provide patient with walking aids - walker, cane, crutches/Reinforce activity limits and safety measures with patient and family/Review medications for side effects contributing to fall risk/Sit up slowly, dangle for a short time, stand at bedside before walking/Tailored Fall Risk Interventions/Toileting schedule using arm’s reach rule for commode and bathroom/Visual Cue: Yellow wristband and red socks/Bed in lowest position, wheels locked, appropriate side rails in place/Call bell, personal items and telephone in reach/Instruct patient to call for assistance before getting out of bed or chair/Non-slip footwear when patient is out of bed/Jean to call system/Physically safe environment - no spills, clutter or unnecessary equipment/Purposeful Proactive Rounding/Room/bathroom lighting operational, light cord in reach

## 2023-07-19 NOTE — H&P ADULT - HISTORY OF PRESENT ILLNESS
72 yo female w/ PMHx for HTN, DM, migraines presents to ED w/ LUE numbness and distal LUE weakness. Patient reports that around 3:30 PM she was carrying a box when she felt weakness and heaviness in her LUE. Patient took one 81 aspirin and symptoms did not resolve. Patient was last seen in the ED on 7/12 for left facial drooping that resolved without any intervention. She came to the ED and code stroke was called at 5:47 PM.     In the ED. patient was found to be within the window for tpa. CT head was negative for hemorrhage. Patient received tpa at 5:40 PM.  72 yo female w/ PMHx for HTN, DM, migraines (resolved) presents to ED w/ LUE weakness. Patient reports that around 3:30 PM she was carrying a box when she felt weakness and heaviness in her LUE. She took two 325 mg aspirin and symptoms did not resolve. Reports that she "couldn't control my hand" and unable to open/close her fist. Neighbor drove the patient to the ED and code stroke was called. Denies headache, changes in vision/hearing, numbness in extremities, chest pain, SOB, abdominal pain, N/V/D/C, dysuria.     Patient was last seen in the ED on 7/12 for left facial drooping that resolved without any intervention. Patient reports that she had the left facial droop and endorsed vision changes "seeing zig-zags." Reports that her symptoms improved and nearly resolved while still in ED. Patient left AMA and was told to get MRI but was not able to do so in the interim.     In the ED. patient was found to be within the window for tpa. CT head was negative for hemorrhage. Patient received tpa at around 5:40 PM. Patient was admitted to MICU for monitoring s/p tpa iso stroke.  74 yo female w/ PMHx for HTN, DM, migraines (resolved) presents to ED w/ LUE weakness. Patient reports that around 3:30 PM she was carrying a box when she felt weakness and heaviness in her LUE. She took two 325 mg aspirin and symptoms did not resolve. Reports that she "couldn't control my hand" and unable to open/close her fist. Neighbor drove the patient to the ED and code stroke was called. This morning she reports having a headache but did not take anything for it. Currently denies headache, changes in vision/hearing, numbness in extremities, chest pain, SOB, abdominal pain, N/V/D/C, dysuria.     Patient was last seen in the ED on 7/12 for left facial drooping that resolved without any intervention. Patient reports that she had the left facial droop and endorsed vision changes "seeing zig-zags." Reports that her symptoms improved and nearly resolved while still in ED. Patient left AMA and was told to get MRI but was not able to do so in the interim.     In the ED. patient was found to be within the window for tpa. CT head was negative for hemorrhage. Patient received tpa at around 5:40 PM. Patient was admitted to MICU for monitoring s/p tpa iso stroke.

## 2023-07-19 NOTE — ED ADULT NURSE NOTE - OBJECTIVE STATEMENT
Stroke RN:    72 yo female, hx of HTN, HLD, DM, c/o L arm weakness as of 330pm today while taking out garbage. pt w decreased sensation to L UE. facial sensory intact. pt w steady gait. pt here several days ago w L facial droop. no facial asymmetry noted on assessment. 20g piv to R ac by KITTY cadena RN. pt in CT scan.

## 2023-07-19 NOTE — H&P ADULT - NSHPREVIEWOFSYSTEMS_GEN_ALL_CORE
REVIEW OF SYSTEMS:    CONSTITUTIONAL: No weakness, fevers or chills  EYES/ENT: No visual changes;  No vertigo or throat pain   NECK: No pain or stiffness  RESPIRATORY: No cough, wheezing, hemoptysis; No shortness of breath  CARDIOVASCULAR: No chest pain or palpitations  GASTROINTESTINAL: No abdominal or epigastric pain. No nausea, vomiting, or hematemesis; No diarrhea or constipation. No melena or hematochezia.  GENITOURINARY: No dysuria, frequency or hematuria  NEUROLOGICAL: No numbness or weakness  SKIN: No itching, rashes  MUSCULOSKELETAL: No joint pain, muscle pain. REVIEW OF SYSTEMS:    CONSTITUTIONAL: No weakness, fevers or chills  EYES/ENT: No visual changes;  No vertigo or throat pain   NECK: No pain or stiffness  RESPIRATORY: No cough, wheezing, hemoptysis; No shortness of breath  CARDIOVASCULAR: No chest pain or palpitations  GASTROINTESTINAL: No abdominal or epigastric pain. No nausea, vomiting, or hematemesis; No diarrhea or constipation. No melena or hematochezia.  GENITOURINARY: No dysuria, frequency or hematuria  NEUROLOGICAL: (+) weakness in LUE  SKIN: No itching, rashes  MUSCULOSKELETAL: No joint pain, muscle pain.

## 2023-07-19 NOTE — ED ADULT NURSE NOTE - NS ED NURSE LEVEL OF CONSCIOUSNESS MENTAL STATUS
Small left forearm laceration when reaching into a steel bin while working at [address]. Denies pain. Pressure dressing on arrival.  Triaged trauma 2.   Not up to date on tetanus
Awake

## 2023-07-19 NOTE — ED ADULT NURSE NOTE - NSFALLUNIVINTERV_ED_ALL_ED
Bed/Stretcher in lowest position, wheels locked, appropriate side rails in place/Call bell, personal items and telephone in reach/Instruct patient to call for assistance before getting out of bed/chair/stretcher/Non-slip footwear applied when patient is off stretcher/[place]pp to call system/Physically safe environment - no spills, clutter or unnecessary equipment/Purposeful proactive rounding/Room/bathroom lighting operational, light cord in reach

## 2023-07-19 NOTE — H&P ADULT - NSHPLABSRESULTS_GEN_ALL_CORE
LABS:                         13.8   7.75  )-----------( 281      ( 19 Jul 2023 17:10 )             42.8     07-19    139  |  101  |  20  ----------------------------<  116<H>  4.3   |  26  |  0.83    Ca    9.9      19 Jul 2023 17:10    TPro  7.5  /  Alb  4.7  /  TBili  0.4  /  DBili  x   /  AST  22  /  ALT  27  /  AlkPhos  63  07-19    PT/INR - ( 19 Jul 2023 17:10 )   PT: 10.9 sec;   INR: 0.94 ratio         PTT - ( 19 Jul 2023 17:10 )  PTT:34.3 sec      Urinalysis Basic - ( 19 Jul 2023 17:10 )    Color: x / Appearance: x / SG: x / pH: x  Gluc: 116 mg/dL / Ketone: x  / Bili: x / Urobili: x   Blood: x / Protein: x / Nitrite: x   Leuk Esterase: x / RBC: x / WBC x   Sq Epi: x / Non Sq Epi: x / Bacteria: x          POCT Blood Glucose.: 103 mg/dL (07-19 @ 16:53)          CAPILLARY BLOOD GLUCOSE  POCT Blood Glucose.: 103 mg/dL (07-19 @ 16:53)      RADIOLOGY, EKG & ADDITIONAL TESTS: Reviewed.

## 2023-07-19 NOTE — CONSULT NOTE ADULT - TIME BILLING
73-year-old ? handed lady evaluated at [facility] on 7/20/2023 with left arm weakness.  History and exam as above, with minor changes.  ROS otherwise negative.  Exam.  Alert and attentive; left side: Deltoid 5/5; wrist extensors 4/5; iliopsoas 4+/5 with giveway; anterior tibialis 5/5; gait not tested; remainder of neurologic exam was nonfocal.  CT head (7/19/2023) to my eye was unremarkable.  CTA neck (7/19/2023) to my eye showed moderate right ICA stenosis, officially measured at approximately 60%.  CTA head (7/19/2023) to my eye was unremarkable.    Impression.  About 1 week PTA she presented with transient left facial palsy and and apparently vertigo.  She reportedly signed out AMA before further work-up and apparently never started dual antiplatelet therapy.  She was diagnosed with "TIA".  On 7/19/2023 she developed left arm weakness and numbness, a monomelic sensory-motor syndrome.  She was treated with IV tenecteplase and has slightly improved.  Her presentation is consistent with right brain dysfunction, probably an ischemic stroke of uncertain mechanism.  Whether her previous episode and her current presentation share a common localization and mechanism is also uncertain.  CTA suggested moderate right ICA stenosis of approximately 60%.  If this is accurate, and if she has had right hemispheric ischemia, then that may suggest that her right carotid stenosis is recurrently symptomatic, but at this point this is uncertain, and it's unclear whether there is a role for revascularization.  Suggest.  MRI brain; carotid Doppler; TTE; after 24-hour CT, start aspirin and chemical DVT prophylaxis ASAP; atorvastatin 80 mg daily, target LDL less than 70; PT/OT.

## 2023-07-19 NOTE — H&P ADULT - NSHPSOCIALHISTORY_GEN_ALL_CORE
Former smoker; social etoh use; denies recreational drug use    Lives at home with  (who is currently ill) and dog; she does all her ADL's/IADL's. Does all the cooking, cleaning, shopping for food. Takes care of the house.

## 2023-07-19 NOTE — ED PROVIDER NOTE - NS ED ROS FT
CONSTITUTIONAL: No weakness, fevers or chills  EYES/ENT: No visual changes;  No vertigo or throat pain   NECK: No pain or stiffness  RESPIRATORY: No cough, wheezing, hemoptysis; shortness of breath  CARDIOVASCULAR: No chest pain or palpitations  GASTROINTESTINAL: No abdominal or epigastric pain. No nausea, vomiting, or hematemesis; No diarrhea or constipation. No melena or hematochezia.  GENITOURINARY: No dysuria, frequency or hematuria  NEUROLOGICAL: No numbness or weakness  SKIN: No itching, burning, rashes, or lesions     All other review of systems is negative unless indicated above.

## 2023-07-19 NOTE — ED PROVIDER NOTE - OBJECTIVE STATEMENT
73 Yr female now presents with left arm weakness, numbness from 4 PM onwards. Patient reports developing weakness suddenly, this led to her using Aspirin 81 mg. Patient denies having headache, blurry vision, facial numbness/weakness, leg weakness/numbness, ataxia, SOB, chest pain, abdominal pain. Patient visited ER 1 week prior with left facial asymmetry, CTA -ve, left AMA.

## 2023-07-19 NOTE — H&P ADULT - ATTENDING COMMENTS
73F with PMHx of HTN, DM, TIA (seen in ED 7/12 for L facial droop) who presents with LUE weakness x 1 day. Patient was within tpa window and CT head showed no hemorrhage. Received tpa in ED with noted gingival bleeding on exam. Patient was admitted to MICU for stroke workup and tpa management.   appreciate neuro input  neuro checks q1h  repeat Ct head in 24 hours, if any change in neuro exam will repeat Ct head stat  stroke work up, MRI, TTE  permissive HTN, consider Cardene gtt if needed  hold AC, montior for bleed, seizure precautions   if repeat head Ct scan is normal in 24 hours will start ASA and lovenox   statin   tele monitor in MICU  DVT ppx scds for now  Full code

## 2023-07-19 NOTE — CONSULT NOTE ADULT - ASSESSMENT
HPI: Patient MAY SORIANO is a 73y (1950) wo/man with a PMHx significant for HTN, DM, migraines, TIA last week (not on any AC/AP daily) presents to ED as a code stroke for LUE numbness and distal LUE weakness. LKW 3:30 PM 7/19/23. Pt folding a box around 3:30 PM when she noted sudden on weakness in her LUE. Pt states she took one baby ASA during that time. Still with symptoms in ED.  CT negative for hemorrhage. Pt received tenecteplase at 5:40 PM today given disabling symptom, after the contraindications were reviewed- did not meet any contraindications.     NIH 2 (LUE numbness, mild drift in LUE)  preMRS 0    Of not pt presented with  L face heaviness (for unclear duration)  and vertigo on 7/12/23. LKW 10:30 PM on 7/11/23 when pt went to bed. Symptoms resolved so diagnosed as a TIA and then pt went home due to family issues, therefore did not get the MR brain inpatient. Was recommended ASA and plavix on discharge but pt states she has not been taking them daily.      Impression: Acute LUE distal weakness with paresthesias possibly 2/2 acute R brain stroke (in hand knob region?) vs other etiology 2/2 unclear mechanism at this time in the setting of recent TIA.    Recommendations:   Imaging:  -MRI brain  w/o contrast  -TTE  -Repeat noncontrast CT in 24 hours; repeat noncontrast CT earlier if there are significant changes in pt's mental status    Secondary prevention of stroke:   -Permissive HTN to 180/105 for 24 hrs then gradual normotension over 2-3 days  -Avoid antiplatelets/anticoagulants for 24 hours post teneceteplase  -81 mg aspirin daily 24 hours after teneceteplase if repeat CT negative for hemorrhage. May consider adding other anticoagulants/antiplatelets if indicated if no contraindications after repeated 24 hour CT.   -Atorvastatin 80 mg; titrate for LDL <70     Misc/additional recs:   -toxic metabolic infectious w/u per primary team  -Neuro checks Q4  -SCDs only for DVT prophylaxis  -dysphagia screen  -Speech and swallow eval if dysphagia screen fails  -Head of bed > 30 degrees for aspiration prevention   -NPO except meds until dysphagia screen passed  -Telemetry  -Fall precautions, aspiration precautions, seizure precautions  -Lipid panel, hba1c, CBC, CMP, coags,   -PT/OT  -Possible ILR - inpatient or outpatient- to rule out arrhythmia if found to have ischemic stroke (if pt does not have ILR already)    Case discussed with Dr. Dillon Lyons    under supervision of stroke attending Dr. Libman HPI: Patient MAY SORIANO is a 73y (1950) wo/man with a PMHx significant for HTN, DM, migraines, TIA last week (not on any AC/AP daily) presents to ED as a code stroke for LUE numbness and distal LUE weakness. LKW 3:30 PM 7/19/23. Pt folding a box around 3:30 PM when she noted sudden on weakness in her LUE. Pt states she took one baby ASA during that time. Still with symptoms in ED.  CT negative for hemorrhage. Pt received tenecteplase at 5:40 PM today given disabling symptom, after the contraindications were reviewed- did not meet any contraindications. Pt was explained the risks/benefits of the medication (prior to her receiving it) and pt agreed for the treatment.     NIH 2 (LUE numbness, mild drift in LUE)  preMRS 0    Of not pt presented with  L face heaviness (for unclear duration)  and vertigo on 7/12/23. LKW 10:30 PM on 7/11/23 when pt went to bed. Symptoms resolved so diagnosed as a TIA and then pt went home due to family issues, therefore did not get the MR brain inpatient. Was recommended ASA and plavix on discharge but pt states she has not been taking them daily.      Impression: Acute LUE distal weakness with paresthesias possibly 2/2 acute R brain stroke (in hand knob region?) vs other etiology 2/2 unclear mechanism at this time in the setting of recent TIA.    Recommendations:   Imaging:  -MRI brain  w/o contrast  -TTE  -Repeat noncontrast CT in 24 hours; repeat noncontrast CT earlier if there are significant changes in pt's mental status    Secondary prevention of stroke:   -Permissive HTN to 180/105 for 24 hrs then gradual normotension over 2-3 days  -Avoid antiplatelets/anticoagulants for 24 hours post teneceteplase  -81 mg aspirin daily 24 hours after teneceteplase if repeat CT negative for hemorrhage. May consider adding other anticoagulants/antiplatelets if indicated if no contraindications after repeated 24 hour CT.   -Atorvastatin 80 mg; titrate for LDL <70     Misc/additional recs:   -toxic metabolic infectious w/u per primary team  -Neuro checks Q4  -SCDs only for DVT prophylaxis  -dysphagia screen  -Speech and swallow eval if dysphagia screen fails  -Head of bed > 30 degrees for aspiration prevention   -NPO except meds until dysphagia screen passed  -Telemetry  -Fall precautions, aspiration precautions, seizure precautions  -Lipid panel, hba1c, CBC, CMP, coags,   -PT/OT  -Possible ILR - inpatient or outpatient- to rule out arrhythmia if found to have ischemic stroke (if pt does not have ILR already)    Case discussed with Dr. Dillon Lyons    under supervision of stroke attending Dr. Libman

## 2023-07-19 NOTE — ED PROVIDER NOTE - PHYSICAL EXAMINATION
PHYSICAL EXAM:  GENERAL: NAD, lying in bed comfortably  HEAD:  Atraumatic, Normocephalic  EYES: EOMI, PERRLA, conjunctiva and sclera clear  ENT: No erythema/pallor/petechiae/lesions  NECK: Supple, No JVD  LUNG: CTA b/l; no r/r/w  HEART: RRR, +S1/S2; No m/r/g  ABDOMEN: soft, NT/ND; BS audible   EXTREMITIES:  2+ Peripheral Pulses, brisk cap refill. No clubbing, cyanosis, or edema  NERVOUS SYSTEM:  AAOx3, CN 1-12 intact, speech is clear. Left UE pronator drift. Reports sensory numbness to left forearm. No additional sensory/motor deficits. Gait is intact.    SKIN: No rashes or lesions

## 2023-07-19 NOTE — PATIENT PROFILE ADULT - DO YOU FEEL UNSAFE AT HOME, WORK, OR SCHOOL?
Progress Notes by Savage Wetzel at 09/07/17 03:05 PM     Author:  Savage Wetzel Service:  (none) Author Type:  Certified Medical Assistant     Filed:  09/07/17 03:05 PM Encounter Date:  9/7/2017 Status:  Signed     :  Savage Wetzel (Certified Medical Assistant)              We have recommended to patient/parent/guardian that they should wait 15 minutes after receiving an injection.[DR1.1T] tdap[DR1.1M]     Electronically Signed by:    Savage Wetzel , 9/7/2017[DR1.1T]      Revision History        User Key Date/Time User Provider Type Action    > DR1.1 09/07/17 03:05 PM Kathie Avalos Certified Medical Assistant Sign    M - Manual, T - Template no

## 2023-07-19 NOTE — ED ADULT TRIAGE NOTE - BEFAST SCREENING
[address], [place], [address]  Progress Note  POS: Nursing Facility/LTC-32    Unable to obtain from patient due to: Dementia; additional history obtained speaking with nursing/ nursing note review  Chief Complaint/Reason for visit: Follow up of chronic medical conditions   History of Present Illness: 79year old female evaluated for routine follow up of chronic medical conditions  Weight stable  Continues on baclofen for upper extremity spasticity  No concerns reported per nursing  Past Medical History: unchanged from history and physical  Family History: unchanged from history and physical  Social History: unchanged from history and physical  Resident Since: 8/7/13  Review of systems: Review of Systems   Unable to perform ROS: Dementia     Medications: No changes made  Allergies: NKDA    Physical Exam    Weight: 164 6lb Temp:97 3F BP:110/72 Pulse:74 Resp:20 O2 Sat:94%RA  Constitutional: Well-nourished and Normocephalic    Physical Exam  Vitals and nursing note reviewed  Constitutional:       General: She is awake  She is not in acute distress  Appearance: She is well-developed and well-groomed  She is not ill-appearing, toxic-appearing or diaphoretic  HENT:      Head: Normocephalic and atraumatic  Right Ear: External ear normal       Left Ear: External ear normal       Nose: No rhinorrhea  Mouth/Throat:      Mouth: Mucous membranes are moist       Pharynx: Oropharynx is clear  Eyes:      General: No scleral icterus  Right eye: No discharge  Left eye: No discharge  Conjunctiva/sclera: Conjunctivae normal    Cardiovascular:      Rate and Rhythm: Normal rate and regular rhythm  Heart sounds: S1 normal and S2 normal    Pulmonary:      Effort: Pulmonary effort is normal  No respiratory distress  Breath sounds: No wheezing  Abdominal:      General: There is no distension  Palpations: Abdomen is soft     Musculoskeletal:         General: No tenderness  Cervical back: No rigidity  Right lower leg: No edema  Left lower leg: No edema  Skin:     General: Skin is warm and dry  Coloration: Skin is not jaundiced or pale  Neurological:      Mental Status: She is alert  Mental status is at baseline  Cranial Nerves: No facial asymmetry  Motor: Abnormal muscle tone (increased tone b/l UEs with mild flexion contractures of hands b/l) present  Psychiatric:         Speech: She is communicative  Behavior: Behavior is cooperative         Assessment/Plan:  79year old female with:    Muscle spasm  With increased tone of b/l upper extremities  In setting of advanced dementia  Continue baclofen, repositioning; monitor skin closely    Dysphagia  Continue modified diet  Aspiration precautions  Continue to monitor weights monthly- have been stable per review    Late onset Alzheimer's disease without behavioral disturbance ([facility] [address] )  Continue supportive care  Continue LTCF for 24/7 and ADL care including assist for feeding  Management of chronic medical conditions as outlined     Patt Hernandes DO  8/6/21 Positive

## 2023-07-19 NOTE — H&P ADULT - NSHPPHYSICALEXAM_GEN_ALL_CORE
PHYSICAL EXAM:  GENERAL: NAD, well-groomed, well-developed  HEAD:  Atraumatic, Normocephalic  EYES: EOMI, PERRLA, conjunctiva and sclera clear  ENMT: No tonsillar erythema, exudates, or enlargement; Moist mucous membranes  NECK: Supple, No JVD, Normal thyroid  HEART: Regular rate and rhythm; No murmurs, rubs, or gallops  RESPIRATORY: CTA B/L, No W/R/R  ABDOMEN: Soft, Nontender, Nondistended; Bowel sounds present  NEUROLOGY: A&Ox3, nonfocal, moving all extremities  EXTREMITIES:  2+ Peripheral Pulses, No clubbing, cyanosis, or edema  SKIN: warm, dry, normal color, no rash or abnormal lesions PHYSICAL EXAM:  GENERAL: NAD, well-groomed, well-developed  HEAD:  Atraumatic, Normocephalic  EYES: EOMI, PERRLA, conjunctiva and sclera clear  ENMT: No tonsillar erythema, exudates, or enlargement; Moist mucous membranes with dried blood on lips and bleeding from the gums   NECK: Supple, No JVD, Normal thyroid  HEART: Regular rate and rhythm; No murmurs, rubs, or gallops  RESPIRATORY: CTA B/L, No W/R/R  ABDOMEN: Soft, Nontender, Nondistended; Bowel sounds present  EXTREMITIES:  2+ Peripheral Pulses, No clubbing, cyanosis, or edema  SKIN: warm, dry, normal color, no rash or abnormal lesions  NEUROLOGY: A&Ox3; CN's 2-12 grossly intact; UE's strength 5/5 b/ (R>L); LE's strength 5/5 b/l; sensation grossly intact in UE's and LE's b/l

## 2023-07-19 NOTE — ED PROVIDER NOTE - ATTENDING CONTRIBUTION TO CARE
73-year-old female with history of high blood pressure coming in with weakness in her left arm.  States she remembers being completely normal at 3:30 PM today which was an hour and half ago.  Mentions thinks her symptoms started around 4:00 which is 1 hour from now.  RN called to triage for code stroke eval–code stroke called.  Patient is reporting weakness in her left arm.  States she feels like she cannot control her left arm.  Patient was here about a week ago for facial droop that resolved.  States her CTs were negative at that time and she was told to follow-up with neurology outpatient for MRIs which she did not get time to do.  Patient denies dizziness, room spinning sensation, changes in vision, chest pain, shortness of breath, pain in the arm, numbness or tingling in the arm, changes in strength or sensation in lower extremities, abdominal pain, nausea, vomiting, urinary complaints, fevers or chills.  Cranial nerves III to XII grossly intact.  Patient has mild drift in left upper extremity with 4 out of 5  strength in left upper extremity.  5 out of 5 strength in right upper extremity and lower extremities.  Sensation intact in all of the extremities.  Concern for stroke, electrolyte abnormality.  Glucose of 103 in the triage area.  as neuro team was evaluating her she reported numbess in lue

## 2023-07-19 NOTE — ED PROVIDER NOTE - PROGRESS NOTE DETAILS
Jaylen SANDERSON: Patient received Tenecteplase. MICU informed, will review/admit patient for neuro-monitoring. Jaylen SANDERSON: Patient reports minimal gum bleeding. Appears alert, AAOx3. Reports recovery in strength of left shoulder/elbow muscles. Reports improvement in numbness for forearm. Flexion/extension at wrist,  strength still remains poor.

## 2023-07-19 NOTE — ED ADULT NURSE NOTE - BEFAST ARM NUMBNESS
Patient called back to confirm Pulmonary appt, also trying to have [facility] send records over before Pulmonary appointment. If receive we will forward to Dr. Posada's office.   
Received a message from [facility], scheduled patient appointment for May 5 at 8:15am via telemedicine.    Left patient a message to call our office to confirm receipt of message also to call [facility] to follow up for appointment details.   
Spoke with Amelia Gimenez from Dr. Posada's office in regards to getting patient a sooner appointment for sleep apnea.  Patient has a cpap machine from [facility] that she is non-compliant with and has to be returned.  She will schedule an appointment with Dr. Posada who will request the sleep study results from the [facility] to see if she needs another sleep study or not.  Rand Hinojosa from [facility] will reach out to patient to schedule appt with Dr. Posada [phone]  
Yes

## 2023-07-19 NOTE — H&P ADULT - ASSESSMENT
A/P: 73yFemale with PMHx of HTN, DM, migraines who presents with     Previously seen in ED on 7/12 for facial droop that has since resolved.     NEURO:  #Mental status: baseline  #Stroke s/p tpa   - AOx  - q1 neuro checks  - MRI brain w/o contrast  - TTE  - repreat noncon CT in 24 hrs; earlier if mental status changes  - permissive HTN to 180/105 for 24 hrs then gradual normotension over 2-3 days  - head of bed > 30 degrees for aspiration prevention     CV:  #Hemodynamically stable  - telemetry  - atorvastatin 80 mg  - f/u lipid, a1c    PULM:  - satting well on RA    RENAL:  - Monitor I/O's     GI:  #Diet: NPO pending dysphagia screen  - speech and swallow eval if dysphagia screen fails     ENDO:  #DM2: HbA1c   - Insulin Sliding Scale    METABOLIC:  #Electrolyte abnormalities  - Replete lytes as needed     HEMATOLOGIC:  #DVT prophylaxis w/ SCDs only   - avoid antiplatelets/AC's for 24 hrs post tpa  - 81 mg aspiring daily 24 hrs post tpa if repeat CT neg for hemorrhage    ID:  #Infection work up  - f/u B12, folate, B12    SKIN:  #Lines: A/P: 73F with PMHx of HTN, DM, TIA (seen in ED 7/12 for L facial droop) who presents with LUE weakness x 1 day. Patient was within tpa window and CT head showed no hemorrhage. Received tpa in ED with noted gingival bleeding on exam. Patient was admitted to MICU for stroke workup and tpa management.     NEURO:  #Mental status: baseline  #Stroke s/p tpa   - AOx  - q1 neuro checks  - f/u MRI brain w/o contrast  - f/u TTE  - f/u repreat noncon CT in 24 hrs; earlier if mental status changes  - permissive HTN to 180/105 for 24 hrs then gradual normotension over 2-3 days  - avoid antiplatelets/AC's for 24 hrs post tpa  - 81 mg aspiring daily 24 hrs post tpa if repeat CT neg for hemorrhage  - head of bed > 30 degrees for aspiration prevention     CV:  #Hemodynamically stable  - telemetry  - atorvastatin 80 mg  - f/u lipid, a1c    PULM:  - satting well on RA    RENAL:  - Monitor I/O's     GI:  #Diet: NPO pending dysphagia screen  - speech and swallow eval if dysphagia screen fails     ENDO:  #DM2: HbA1c   - Insulin Sliding Scale    METABOLIC:  #Electrolyte abnormalities  - Replete lytes as needed     HEMATOLOGIC:  #DVT prophylaxis w/ SCDs only   - f/u CBC, CMP, coags, lipid panel, A1C    ID:  #Infection work up  - f/u B12, folate, B12    SKIN:  #Lines: peripheral lines intact    PT/OT eval.  A/P: 73F with PMHx of HTN, DM, TIA (seen in ED 7/12 for L facial droop) who presents with LUE weakness x 1 day. Patient was within tpa window and CT head showed no hemorrhage. Received tpa in ED with noted gingival bleeding on exam. Patient was admitted to MICU for stroke workup and tpa management.     NEURO:  #Mental status: baseline  #Stroke s/p tpa   - AOx  - q1 neuro checks  - f/u MRI brain w/o contrast  - f/u TTE  - f/u repreat noncon CT in 24 hrs; earlier if mental status changes  - permissive HTN to 180/105 for 24 hrs then gradual normotension over 2-3 days  - avoid antiplatelets/AC's for 24 hrs post tpa  - 81 mg aspiring daily 24 hrs post tpa if repeat CT neg for hemorrhage  - head of bed > 30 degrees for aspiration prevention     CV:  #Hemodynamically stable  - telemetry  - atorvastatin 80 mg  - f/u lipid, a1c    PULM:  - satting well on RA    RENAL:  - Monitor I/O's     GI:  #Diet: NPO pending dysphagia screen  - speech and swallow eval if dysphagia screen fails     ENDO:  #DM2: HbA1c pending  - Insulin Sliding Scale    METABOLIC:  #Electrolyte abnormalities  - Replete lytes as needed     HEMATOLOGIC:  #DVT prophylaxis w/ SCDs only   - f/u CBC, CMP, coags, lipid panel, A1C    ID:  #Infection work up  - f/u B12, folate  - f/u syphilis    SKIN:  #Lines: peripheral lines intact    PT/OT eval.

## 2023-07-19 NOTE — ED PROVIDER NOTE - CLINICAL SUMMARY MEDICAL DECISION MAKING FREE TEXT BOX
73 Yr female DM HTN HLD Migraine now presents with left arm weakness, numbness from 4 PM onwards. History of visiting ER 1 week prior with left facial asymmetry, CTA -ve, left AMA. VS wnl. PE Left UE pronator drift. Sensory numbness to left forearm. Otherwise wnl. Stroke r/o. Will order STAT CT Angio head/neck. Informed Neuro.

## 2023-07-19 NOTE — ED ADULT TRIAGE NOTE - CHIEF COMPLAINT QUOTE
sudden onset of left arm weakness starting approx. 1 hour ago, pt states "cannot control arm", MD Negron called for eval. Code stroke initiated

## 2023-07-19 NOTE — CONSULT NOTE ADULT - SUBJECTIVE AND OBJECTIVE BOX
Neurology - Consult Note    -  Spectra: [phone] ([facility]), [phone] ([facility])  -    HPI: Patient MAY SORIANO is a 73y (1950) wo/man with a PMHx significant for HTN, DM, migraines, TIA last week (not on any AC/AP daily) presents to ED as a code stroke for LUE numbness and distal LUE weakness. LKW 3:30 PM 7/19/23. Pt folding a box around 3:30 PM when she noted sudden on weakness in her LUE. Pt states she took one baby ASA during that time. Still with symptoms in ED.  CT negative for hemorrhage. Pt received tenecteplase at 5:40 PM today given disabling symptom, after the contraindications were reviewed- did not meet any contraindications.     NIH 2 (LUE numbness, mild drift in LUE)  preMRS 0    Of not pt presented with  L face heaviness (for unclear duration)  and vertigo on 7/12/23. LKW 10:30 PM on 7/11/23 when pt went to bed. Symptoms resolved so diagnosed as a TIA and then pt went home due to family issues, therefore did not get the MR brain inpatient. Was recommended ASA and plavix on discharge but pt states she has not been taking them daily.          Review of Systems:    CONSTITUTIONAL: No fevers or chills  EYES AND ENT: No visual changes or no throat pain   NECK: No pain or stiffness  RESPIRATORY: No hemoptysis or shortness of breath  NEUROLOGICAL: +As stated in HPI above  SKIN: No itching, burning, rashes, or lesions   All other review of systems is negative unless indicated above.    Allergies:  No Known Allergies      PMHx/PSHx/Family Hx: As above, otherwise see below   Hypertension        Social Hx:  No current use of tobacco, alcohol, or illicit drugs    Medications:  MEDICATIONS  (STANDING):  sodium chloride 0.9% lock flush 10 milliLiter(s) IV Push once  sodium chloride 0.9% lock flush 10 milliLiter(s) IV Push once  tenecteplase Injectable. 19 milliGRAM(s) IV Push. Once    MEDICATIONS  (PRN):      Vitals:  T(C): 36.7 (07-19-23 @ 17:45), Max: 36.8 (07-19-23 @ 16:55)  HR: 71 (07-19-23 @ 17:45) (71 - 74)  BP: 147/87 (07-19-23 @ 17:45) (147/79 - 169/92)  RR: 15 (07-19-23 @ 17:45) (15 - 17)  SpO2: 98% (07-19-23 @ 17:45) (98% - 100%)    Physical Examination:  General - NAD  Cardiovascular - Peripheral pulses palpable, no edema  Eyes -  Fundoscopy not performed due to safety precautions in the setting of the COVID-19 pandemic    Neurologic Exam:  Mental status - Awake, Alert, Oriented to person, place, and time. Speech fluent, repetition and naming intact. Follows simple and complex commands. Attention/concentration, recent and remote memory (including registration and recall), and fund of knowledge intact    Cranial nerves - PERRLA, VFF, EOMI, face sensation (V1-V3) intact b/l, facial strength intact without asymmetry b/l, hearing intact b/l, palate with symmetric elevation, sternocleidomastiod 5/5 strength b/l, tongue midline on protrusion with full lateral movement    Motor - Normal bulk and tone throughout. No pronator drift.  Strength testing            Deltoid      Biceps      Triceps     Wrist Extension    Wrist Flexion     Interossei         R            5                 5               5                     5                    5                        5                 5  L             4+              5               4+                     3                    4-                     5                 5              Hip Flexion    Hip Extension    Knee Flexion    Knee Extension    Dorsiflexion    Plantar Flexion  R              5                           5                       5                           5                            5                          5  L              5                           5                        5                           5                            5                          5    Mild drift in LUE only    Sensation - Light touch/temperature decreased in LUE    DTR's -             Biceps      Triceps     Brachioradialis      Patellar    Ankle    Toes/plantar response  R             2+             2+                  2+                       2+            2+                 Down  L              2+             2+                 2+                        2+           2+                 Down    Coordination - Finger to Nose intact b/l. No tremors appreciated    Gait and station - Normal casual gait. Romberg (-)    Labs:                        13.8   7.75  )-----------( 281      ( 19 Jul 2023 17:10 )             42.8     07-19    139  |  101  |  20  ----------------------------<  116<H>  4.3   |  26  |  0.83    Ca    9.9      19 Jul 2023 17:10    TPro  7.5  /  Alb  4.7  /  TBili  0.4  /  DBili  x   /  AST  22  /  ALT  27  /  AlkPhos  63  07-19    CAPILLARY BLOOD GLUCOSE  103 (19 Jul 2023 17:47)      POCT Blood Glucose.: 103 mg/dL (19 Jul 2023 16:53)    LIVER FUNCTIONS - ( 19 Jul 2023 17:10 )  Alb: 4.7 g/dL / Pro: 7.5 g/dL / ALK PHOS: 63 U/L / ALT: 27 U/L / AST: 22 U/L / GGT: x             PT/INR - ( 19 Jul 2023 17:10 )   PT: 10.9 sec;   INR: 0.94 ratio         PTT - ( 19 Jul 2023 17:10 )  PTT:34.3 sec  CSF:                  Radiology:  CT head: INTERPRETATION:  HISTORY/INDICATIONS:  Stroke Code, left arm numbness and   drift    TECHNIQUE:  Serial axial images were obtained from theskull base to the   vertex without intravenous contrast. Sagittal and Coronal reformats were   performed    COMPARISON EXAMINATION: Head 7/12/2023    FINDINGS:  VENTRICLES AND SULCI:  Ventricles, sulci, and cisterns are normal in size   for the patient's age without hydrocephalus. Basal cisterns are patent.  INTRA-AXIAL:  No mass, blood or abnormal attenuation is seen.  Age   appropriate involutional changes and small vessel white matter ischemic   type changes.  EXTRA-AXIAL:  No mass or collection is seen.  VISUALIZED SINUSES:  Clear.  VISUALIZED MASTOIDS:  Clear.  CALVARIUM:  Intact.  MISCELLANEOUS:  None.    IMPRESSION:  No acute intracranial bleeding, mass effect, or shift.    ACC: [id number] EXAM:  CT BRAIN PERFUSION MAPS STROKE   ORDERED BY: ELLYN WILBURN     ACC: [id number] EXAM:  CT ANGIO NECK STROKE PROTCL IC   ORDERED BY: ELLYN WILBURN     ACC: [id number] EXAM:  CT ANGIO BRAIN STROKE PROTC IC   ORDERED BY: ELLYN WILBURN     PROCEDURE DATE:  07/19/2023          INTERPRETATION:  CT ANGIO HEAD STROKE PROTOCOL, CT ANGIO NECK STROKE   PROTOCOL, CT PERFUSION WITH MAPS STROKE PROTOCOL  CT PERFUSION  CTA OF THE [place] AND NECK:    INDICATIONS: Stroke Code. Left hand numbness and weakness LCT  No additional clinical history was provided.    TECHNIQUE:  RAPID artificial intelligence was used for perfusion analysis and for   intracranial large vessel occlusion.    Contrast: 95 cc of Omnipaque 350 was administered    CTA [place]:  After the intravenous power injection of non-ionic contrast material,   serial thin sections were obtained through the intracranial circulation   on a multislice CT scanner.  Images were reformatted using a dedicated 3D   software package and viewed on a dedicated workstation in multiple planes.    CTA NECK:  After the intravenous power injection of non-ionic contrast material,   serial thin sections were obtained through the cervical circulation on a   multislice CT scanner.  Images were reformatted using a dedicated 3D   software package and viewed on a dedicated workstation in multiple planes.    COMPARISON EXAMINATION: CTA head and neck 7/12/2023    FINDINGS:    CT RAPID PERFUSION:  CBF<30% volume: 0 ml  Tmax>6.0 s volume: 0 ml  Mismatch volume: 0 ml  Mismatch ratio: none    CORE INFARCT: None.  TISSUE AT RISK: None.  MISMATCH RATIO: None.      CTA [place]:  ANTERIOR CIRCULATION  ICA  CAVERNOUS, SUPRACLINOID, BIFURCATION SEGMENTS: Patent without flow   limiting stenosis.    ANTERIOR CEREBRAL ARTERIES: Bilateral A1, anterior communicating and A2   anterior cerebral arteries are unremarkable in course and caliber without   flow limiting stenosis.    MIDDLE CEREBRAL ARTERIES: Patent bilateral M1, M2, and distal MCA   branches without flow limiting stenosis. Patulous right MCA trifurcation.   No aneurysm.      POSTERIOR CIRCULATION:  VERTEBRAL ARTERIES: Patent without flow limiting stenosis. Dominant right   vertebral artery.  BASILAR ARTERY: Patent no flow limiting stenosis.  POSTERIOR CEREBRAL ARTERIES: Patent without flow limiting stenosis. Right   fetal PCA.      CTA NECK:  GREAT VESSELS: Visualized segments are patent, no flow limiting stenosis.    COMMON CAROTID ARTERIES:  RIGHT CCA: Patent without flow limiting stenosis  LEFT CCA: Patent without flow limiting stenosis    CAROTID BULBS:  RIGHT CB: Patent without flow limiting stenosis  LEFT CB: Patent without flow limiting stenosis. Calcified plaque.    INTERNAL CAROTID ARTERIES: Bilateral calcified plaque.  RIGHT ICA: Patent, with approximately 60% stenosis at the ICA origin by   NASCET criteria.  LEFT ICA: Patent, with atherosclerotic plaque. No evidence for any   hemodynamically significant stenosis at the ICA origin by NASCET criteria.    VERTEBRAL ARTERIES:  RIGHT VA: Patent no evidence for any flow limiting stenosis.  LEFT VA: Patent no evidence for any flow limiting stenosis.      SOFT TISSUES: Groundglass opacities in the right upper lobe, slightly   increased compared to prior CTA head and neck on 7/12/2023. Calcified   left thyroid nodule.  BONES: Degenerative changes.      IMPRESSION:    CT PERFUSION demonstrated: No core infarct or penumbra.  If symptoms persist consider follow up head CT or MRI, MRA  if no   contraindication.    CTA COW:  Patent intracranial circulation without flow limiting stenosis.   Patulous right MCA trifurcation. No aneurysm.    CTA NECK: Similar mild stenosis at the proximal right internal carotid   artery of approximately 60% by NASCET criteria. Bilateral vertebral   arteries are patent without flow limiting stenosis.

## 2023-07-20 LAB
A1C WITH ESTIMATED AVERAGE GLUCOSE RESULT: 6.4 % — HIGH (ref 4–5.6)
ALBUMIN SERPL ELPH-MCNC: 4.1 G/DL — SIGNIFICANT CHANGE UP (ref 3.3–5)
ALP SERPL-CCNC: 49 U/L — SIGNIFICANT CHANGE UP (ref 40–120)
ALT FLD-CCNC: 22 U/L — SIGNIFICANT CHANGE UP (ref 4–33)
ANION GAP SERPL CALC-SCNC: 11 MMOL/L — SIGNIFICANT CHANGE UP (ref 7–14)
AST SERPL-CCNC: 21 U/L — SIGNIFICANT CHANGE UP (ref 4–32)
BILIRUB SERPL-MCNC: 0.7 MG/DL — SIGNIFICANT CHANGE UP (ref 0.2–1.2)
BUN SERPL-MCNC: 15 MG/DL — SIGNIFICANT CHANGE UP (ref 7–23)
CALCIUM SERPL-MCNC: 9.4 MG/DL — SIGNIFICANT CHANGE UP (ref 8.4–10.5)
CHLORIDE SERPL-SCNC: 104 MMOL/L — SIGNIFICANT CHANGE UP (ref 98–107)
CHOLEST SERPL-MCNC: 144 MG/DL — SIGNIFICANT CHANGE UP
CO2 SERPL-SCNC: 25 MMOL/L — SIGNIFICANT CHANGE UP (ref 22–31)
CREAT SERPL-MCNC: 0.78 MG/DL — SIGNIFICANT CHANGE UP (ref 0.5–1.3)
EGFR: 80 ML/MIN/1.73M2 — SIGNIFICANT CHANGE UP
ESTIMATED AVERAGE GLUCOSE: 137 — SIGNIFICANT CHANGE UP
GLUCOSE BLDC GLUCOMTR-MCNC: 105 MG/DL — HIGH (ref 70–99)
GLUCOSE BLDC GLUCOMTR-MCNC: 113 MG/DL — HIGH (ref 70–99)
GLUCOSE BLDC GLUCOMTR-MCNC: 147 MG/DL — HIGH (ref 70–99)
GLUCOSE SERPL-MCNC: 118 MG/DL — HIGH (ref 70–99)
HCT VFR BLD CALC: 40.4 % — SIGNIFICANT CHANGE UP (ref 34.5–45)
HDLC SERPL-MCNC: 65 MG/DL — SIGNIFICANT CHANGE UP
HGB BLD-MCNC: 12.8 G/DL — SIGNIFICANT CHANGE UP (ref 11.5–15.5)
LIPID PNL WITH DIRECT LDL SERPL: 59 MG/DL — SIGNIFICANT CHANGE UP
MAGNESIUM SERPL-MCNC: 2.2 MG/DL — SIGNIFICANT CHANGE UP (ref 1.6–2.6)
MCHC RBC-ENTMCNC: 28.8 PG — SIGNIFICANT CHANGE UP (ref 27–34)
MCHC RBC-ENTMCNC: 31.7 GM/DL — LOW (ref 32–36)
MCV RBC AUTO: 91 FL — SIGNIFICANT CHANGE UP (ref 80–100)
NON HDL CHOLESTEROL: 79 MG/DL — SIGNIFICANT CHANGE UP
NRBC # BLD: 0 /100 WBCS — SIGNIFICANT CHANGE UP (ref 0–0)
NRBC # FLD: 0 K/UL — SIGNIFICANT CHANGE UP (ref 0–0)
PHOSPHATE SERPL-MCNC: 4.2 MG/DL — SIGNIFICANT CHANGE UP (ref 2.5–4.5)
PLATELET # BLD AUTO: 234 K/UL — SIGNIFICANT CHANGE UP (ref 150–400)
POTASSIUM SERPL-MCNC: 3.9 MMOL/L — SIGNIFICANT CHANGE UP (ref 3.5–5.3)
POTASSIUM SERPL-SCNC: 3.9 MMOL/L — SIGNIFICANT CHANGE UP (ref 3.5–5.3)
PROT SERPL-MCNC: 6.7 G/DL — SIGNIFICANT CHANGE UP (ref 6–8.3)
RBC # BLD: 4.44 M/UL — SIGNIFICANT CHANGE UP (ref 3.8–5.2)
RBC # FLD: 12.9 % — SIGNIFICANT CHANGE UP (ref 10.3–14.5)
SODIUM SERPL-SCNC: 140 MMOL/L — SIGNIFICANT CHANGE UP (ref 135–145)
TRIGL SERPL-MCNC: 100 MG/DL — SIGNIFICANT CHANGE UP
WBC # BLD: 6.7 K/UL — SIGNIFICANT CHANGE UP (ref 3.8–10.5)
WBC # FLD AUTO: 6.7 K/UL — SIGNIFICANT CHANGE UP (ref 3.8–10.5)

## 2023-07-20 PROCEDURE — 99291 CRITICAL CARE FIRST HOUR: CPT

## 2023-07-20 PROCEDURE — 70450 CT HEAD/BRAIN W/O DYE: CPT | Mod: 26

## 2023-07-20 PROCEDURE — 99223 1ST HOSP IP/OBS HIGH 75: CPT

## 2023-07-20 PROCEDURE — 93880 EXTRACRANIAL BILAT STUDY: CPT | Mod: 26

## 2023-07-20 RX ORDER — DEXTROSE 50 % IN WATER 50 %
15 SYRINGE (ML) INTRAVENOUS ONCE
Refills: 0 | Status: DISCONTINUED | OUTPATIENT
Start: 2023-07-20 | End: 2023-07-23

## 2023-07-20 RX ORDER — ATORVASTATIN CALCIUM 80 MG/1
40 TABLET, FILM COATED ORAL AT BEDTIME
Refills: 0 | Status: DISCONTINUED | OUTPATIENT
Start: 2023-07-20 | End: 2023-07-21

## 2023-07-20 RX ORDER — INSULIN LISPRO 100/ML
VIAL (ML) SUBCUTANEOUS AT BEDTIME
Refills: 0 | Status: DISCONTINUED | OUTPATIENT
Start: 2023-07-20 | End: 2023-07-23

## 2023-07-20 RX ORDER — POTASSIUM CHLORIDE 20 MEQ
10 PACKET (EA) ORAL ONCE
Refills: 0 | Status: COMPLETED | OUTPATIENT
Start: 2023-07-20 | End: 2023-07-20

## 2023-07-20 RX ORDER — LABETALOL HCL 100 MG
5 TABLET ORAL ONCE
Refills: 0 | Status: DISCONTINUED | OUTPATIENT
Start: 2023-07-20 | End: 2023-07-20

## 2023-07-20 RX ORDER — INSULIN LISPRO 100/ML
VIAL (ML) SUBCUTANEOUS
Refills: 0 | Status: DISCONTINUED | OUTPATIENT
Start: 2023-07-20 | End: 2023-07-23

## 2023-07-20 RX ORDER — ENOXAPARIN SODIUM 100 MG/ML
40 INJECTION SUBCUTANEOUS EVERY 24 HOURS
Refills: 0 | Status: DISCONTINUED | OUTPATIENT
Start: 2023-07-20 | End: 2023-07-21

## 2023-07-20 RX ORDER — DEXTROSE 50 % IN WATER 50 %
25 SYRINGE (ML) INTRAVENOUS ONCE
Refills: 0 | Status: DISCONTINUED | OUTPATIENT
Start: 2023-07-20 | End: 2023-07-23

## 2023-07-20 RX ORDER — ASPIRIN/CALCIUM CARB/MAGNESIUM 324 MG
81 TABLET ORAL DAILY
Refills: 0 | Status: DISCONTINUED | OUTPATIENT
Start: 2023-07-20 | End: 2023-07-21

## 2023-07-20 RX ORDER — ACETAMINOPHEN 500 MG
650 TABLET ORAL EVERY 6 HOURS
Refills: 0 | Status: DISCONTINUED | OUTPATIENT
Start: 2023-07-20 | End: 2023-07-23

## 2023-07-20 RX ORDER — GLUCAGON INJECTION, SOLUTION 0.5 MG/.1ML
1 INJECTION, SOLUTION SUBCUTANEOUS ONCE
Refills: 0 | Status: DISCONTINUED | OUTPATIENT
Start: 2023-07-20 | End: 2023-07-23

## 2023-07-20 RX ORDER — POTASSIUM CHLORIDE 20 MEQ
20 PACKET (EA) ORAL ONCE
Refills: 0 | Status: COMPLETED | OUTPATIENT
Start: 2023-07-20 | End: 2023-07-20

## 2023-07-20 RX ORDER — SODIUM CHLORIDE 9 MG/ML
1000 INJECTION, SOLUTION INTRAVENOUS
Refills: 0 | Status: DISCONTINUED | OUTPATIENT
Start: 2023-07-20 | End: 2023-07-23

## 2023-07-20 RX ORDER — DEXTROSE 50 % IN WATER 50 %
12.5 SYRINGE (ML) INTRAVENOUS ONCE
Refills: 0 | Status: DISCONTINUED | OUTPATIENT
Start: 2023-07-20 | End: 2023-07-23

## 2023-07-20 RX ADMIN — CHLORHEXIDINE GLUCONATE 1 APPLICATION(S): 213 SOLUTION TOPICAL at 06:10

## 2023-07-20 RX ADMIN — ATORVASTATIN CALCIUM 40 MILLIGRAM(S): 80 TABLET, FILM COATED ORAL at 22:04

## 2023-07-20 RX ADMIN — Medication 100 MILLIEQUIVALENT(S): at 14:47

## 2023-07-20 RX ADMIN — Medication 650 MILLIGRAM(S): at 18:50

## 2023-07-20 RX ADMIN — Medication 650 MILLIGRAM(S): at 17:59

## 2023-07-20 RX ADMIN — Medication 20 MILLIEQUIVALENT(S): at 18:00

## 2023-07-20 NOTE — OCCUPATIONAL THERAPY INITIAL EVALUATION ADULT - NSOTDISCHREC_GEN_A_CORE
Anticipate Home with OT to improve functional use of Left hand. Please continue to follow progress notes.

## 2023-07-20 NOTE — OCCUPATIONAL THERAPY INITIAL EVALUATION ADULT - LUE MMT, REHAB EVAL
at least 3+/5 throughout except Wrist Flexion/Extension Grossly 2+/5, Hand Flexion/Extension Grossly 2+/5

## 2023-07-20 NOTE — OCCUPATIONAL THERAPY INITIAL EVALUATION ADULT - MD ORDER
Occupational Therapy (OT) to evaluate and treat. Bedrest order discontinued as pt is s/p 12 hours after tPA. Updated orders: Increase as Tolerated. Out of Bed with assistance. Per UZMA Clancy, pt is okay to participate in OT evaluation and perform activity as tolerated.

## 2023-07-20 NOTE — SWALLOW BEDSIDE ASSESSMENT ADULT - COMMENTS
As per Critical Care Progress Note "73F with PMHx of HTN, DM, TIA (seen in ED 7/12 for L facial droop) who presents with LUE weakness x 1 day. Patient was within tpa window and CT head showed no hemorrhage. Received tpa in ED with noted gingival bleeding on exam. Patient was admitted to MICU for stroke workup and tpa management."     Neurology Note 7/19 "MAY SORIANO is a 73y (1950) wo/man with a PMHx significant for HTN, DM, migraines, TIA last week (not on any AC/AP daily) presents to ED as a code stroke for LUE numbness and distal LUE weakness. LKW 3:30 PM 7/19/23. Pt folding a box around 3:30 PM when she noted sudden on weakness in her LUE. Pt states she took one baby ASA during that time. Still with symptoms in ED.  CT negative for hemorrhage. Pt received tenecteplase at 5:40 PM today given disabling symptom, after the contraindications were reviewed- did not meet any contraindications. Pt was explained the risks/benefits of the medication (prior to her receiving it) and pt agreed for the treatment."     CT Head 7/19 "No acute intracranial bleeding, mass effect, or shift."  CTA Neck 7/19 "Similar mild stenosis at the proximal right internal carotid artery of approximately 60% by NASCET criteria."    Patient seen for bedside swallow assessment in MICU, received upright in bed, alert and oriented. Patient able to follow all 1-step directions. Patient denied dysphagia symptoms.

## 2023-07-20 NOTE — SWALLOW BEDSIDE ASSESSMENT ADULT - SWALLOW EVAL: DIAGNOSIS
Patient accepted PO trials of thin liquids, pureed, and regular solids, presenting with functional oropharyngeal swallow. Oral phase characterized by adequate oral aperture, with slow but functional mastication and A-P transport. Pharyngeal phase characterized by initiation of the pharyngeal swallow with hyolaryngeal elevation and excursion upon digital palpation. There were no overt signs and symptoms of laryngeal penetration/aspiration.

## 2023-07-20 NOTE — PROGRESS NOTE ADULT - SUBJECTIVE AND OBJECTIVE BOX
Significant recent/past 24 hr events:    Subjective:    Review of Systems         [ ] A ten-point review of systems was otherwise negative except as noted.  [ ] Due to altered mental status/intubation, subjective information were not able to be obtained from the patient. History was obtained, to the extent possible, from review of the chart and collateral sources of information.      Patient is a 73y old  Female who presents with a chief complaint of   HPI:  72 yo female w/ PMHx for HTN, DM, migraines (resolved) presents to ED w/ LUE weakness. Patient reports that around 3:30 PM she was carrying a box when she felt weakness and heaviness in her LUE. She took two 325 mg aspirin and symptoms did not resolve. Reports that she "couldn't control my hand" and unable to open/close her fist. Neighbor drove the patient to the ED and code stroke was called. This morning she reports having a headache but did not take anything for it. Currently denies headache, changes in vision/hearing, numbness in extremities, chest pain, SOB, abdominal pain, N/V/D/C, dysuria.     Patient was last seen in the ED on 7/12 for left facial drooping that resolved without any intervention. Patient reports that she had the left facial droop and endorsed vision changes "seeing zig-zags." Reports that her symptoms improved and nearly resolved while still in ED. Patient left AMA and was told to get MRI but was not able to do so in the interim.     In the ED. patient was found to be within the window for tpa. CT head was negative for hemorrhage. Patient received tpa at around 5:40 PM. Patient was admitted to MICU for monitoring s/p tpa iso stroke.  (19 Jul 2023 18:03)    PAST MEDICAL & SURGICAL HISTORY:  Hypertension      Diabetes      History of migraine      No Past Surgical History        FAMILY HISTORY:      Vitals   ICU Vital Signs Last 24 Hrs  T(C): 36.8 (20 Jul 2023 04:00), Max: 37.2 (19 Jul 2023 19:30)  T(F): 98.2 (20 Jul 2023 04:00), Max: 99 (19 Jul 2023 19:30)  HR: 64 (20 Jul 2023 06:00) (64 - 74)  BP: 148/91 (20 Jul 2023 06:00) (130/74 - 171/85)  BP(mean): 107 (20 Jul 2023 06:00) (84 - 851)  ABP: --  ABP(mean): --  RR: 21 (20 Jul 2023 06:00) (14 - 26)  SpO2: 99% (20 Jul 2023 06:00) (97% - 100%)    O2 Parameters below as of 20 Jul 2023 06:00  Patient On (Oxygen Delivery Method): room air            Physical Exam:   Constitutional: NAD, well-groomed, well-developed  HEENT: PERRLA, EOMI, no drainage or redness  Neck: supple,  No JVD, Trachea midline  Back: Normal spine flexure, No CVA tenderness, No deformity or limitation of movement  Respiratory: Breath Sounds equal & clear bilaterally to auscultation, no accessory muscle use noted  Cardiovascular: Regular rate, regular rhythm, normal S1, S2; no murmurs or rub  Gastrointestinal: Soft, non-tender, non distended, no hepatosplenomegaly, normal bowel sounds  Extremities: ORTIZ x 4, no peripheral edema, no cyanosis, no clubbing   Vascular: Equal and normal pulses: 2+ peripheral pulses throughout  Neurological: A+O x 3; speech clear and intact; no sensory, motor  deficits, normal reflexes  Psychiatric: calm, normal mood, normal affect  Musculoskeletal: No joint swelling or deformity; no limitation of movement  Skin: warm, dry, well perfused, no rashes    VENT SETTINGS         I&O's Detail      LABS                        12.8   6.70  )-----------( 234      ( 20 Jul 2023 03:54 )             40.4     07-20    140  |  104  |  15  ----------------------------<  118<H>  3.9   |  25  |  0.78    Ca    9.4      20 Jul 2023 03:54  Phos  4.2     07-20  Mg     2.20     07-20    TPro  6.7  /  Alb  4.1  /  TBili  0.7  /  DBili  x   /  AST  21  /  ALT  22  /  AlkPhos  49  07-20    LIVER FUNCTIONS - ( 20 Jul 2023 03:54 )  Alb: 4.1 g/dL / Pro: 6.7 g/dL / ALK PHOS: 49 U/L / ALT: 22 U/L / AST: 21 U/L / GGT: x           PT/INR - ( 19 Jul 2023 17:10 )   PT: 10.9 sec;   INR: 0.94 ratio         PTT - ( 19 Jul 2023 17:10 )  PTT:34.3 sec        Urinalysis Basic - ( 20 Jul 2023 03:54 )    Color: x / Appearance: x / SG: x / pH: x  Gluc: 118 mg/dL / Ketone: x  / Bili: x / Urobili: x   Blood: x / Protein: x / Nitrite: x   Leuk Esterase: x / RBC: x / WBC x   Sq Epi: x / Non Sq Epi: x / Bacteria: x      POCT Blood Glucose.: 103 mg/dL *H* (07-19-23 @ 16:53)        MEDICATIONS  (STANDING):  atorvastatin 80 milliGRAM(s) Oral at bedtime  chlorhexidine 2% Cloths 1 Application(s) Topical <User Schedule>  potassium chloride  10 mEq/100 mL IVPB 10 milliEquivalent(s) IV Intermittent once    MEDICATIONS  (PRN):      Allergies:  No Known Allergies        CRITICAL CARE TIME SPENT:  minutes of critical care time spent providing medical care for patient's acute illness/conditions that impairs at least one vital organ system and/or poses a high risk of imminent or life threatening deterioration in the patient's condition. It includes time spent evaluating and treating the patient's acute illness as well as time spent reviewing labs, radiology, discussing goals of care with patient and/or patient's family, and discussing the case with a multidisciplinary team, in an effort to prevent further life threatening deterioration or end organ damage. This time is independent of any procedures performed.         Significant recent/past 24 hr events: L arm weakness slightly improved ; - pt c/o mild gum bleeding, but states that it is chronic; resolved the present tiime     Subjective: pt states that weakness in the L arm is better; pt denies any other complaints     Review of Systems         [ ] A ten-point review of systems was otherwise negative except as noted.  [ ] Due to altered mental status/intubation, subjective information were not able to be obtained from the patient. History was obtained, to the extent possible, from review of the chart and collateral sources of information.      Patient is a 73y old  Female who presents with a chief complaint of   HPI:  74 yo female w/ PMHx for HTN, DM, migraines (resolved) presents to ED w/ LUE weakness. Patient reports that around 3:30 PM she was carrying a box when she felt weakness and heaviness in her LUE. She took two 325 mg aspirin and symptoms did not resolve. Reports that she "couldn't control my hand" and unable to open/close her fist. Neighbor drove the patient to the ED and code stroke was called. This morning she reports having a headache but did not take anything for it. Currently denies headache, changes in vision/hearing, numbness in extremities, chest pain, SOB, abdominal pain, N/V/D/C, dysuria.     Patient was last seen in the ED on 7/12 for left facial drooping that resolved without any intervention. Patient reports that she had the left facial droop and endorsed vision changes "seeing zig-zags." Reports that her symptoms improved and nearly resolved while still in ED. Patient left AMA and was told to get MRI but was not able to do so in the interim.     In the ED. patient was found to be within the window for tpa. CT head was negative for hemorrhage. Patient received tpa at around 5:40 PM. Patient was admitted to MICU for monitoring s/p tpa iso stroke.  (19 Jul 2023 18:03)    PAST MEDICAL & SURGICAL HISTORY:  Hypertension      Diabetes      History of migraine      No Past Surgical History        FAMILY HISTORY:      Vitals   ICU Vital Signs Last 24 Hrs  T(C): 36.8 (20 Jul 2023 04:00), Max: 37.2 (19 Jul 2023 19:30)  T(F): 98.2 (20 Jul 2023 04:00), Max: 99 (19 Jul 2023 19:30)  HR: 64 (20 Jul 2023 06:00) (64 - 74)  BP: 148/91 (20 Jul 2023 06:00) (130/74 - 171/85)  BP(mean): 107 (20 Jul 2023 06:00) (84 - 851)  ABP: --  ABP(mean): --  RR: 21 (20 Jul 2023 06:00) (14 - 26)  SpO2: 99% (20 Jul 2023 06:00) (97% - 100%)    O2 Parameters below as of 20 Jul 2023 06:00  Patient On (Oxygen Delivery Method): room air            Physical Exam:   Constitutional: NAD, well-groomed, well-developed  HEENT: PERRLA, EOMI, no drainage or redness  Neck: supple,  No JVD, Trachea midline  Back: Normal spine flexure, No CVA tenderness, No deformity or limitation of movement  Respiratory: Breath Sounds equal & clear bilaterally to auscultation, no accessory muscle use noted  Cardiovascular: Regular rate, regular rhythm, normal S1, S2; no murmurs or rub  Gastrointestinal: Soft, non-tender, non distended, no hepatosplenomegaly, normal bowel sounds  Extremities: ORTIZ x 4, no peripheral edema, no cyanosis, no clubbing   Vascular: Equal and normal pulses: 2+ peripheral pulses throughout  Neurological: L arm strength is 4/5, and L hand  is 4/5,   A+O x 3; speech clear and intact; no other sensory, motor  deficits, normal reflexes  Psychiatric: calm, normal mood, normal affect  Musculoskeletal: No joint swelling or deformity; no limitation of movement  Skin: warm, dry, well perfused, no rashes    VENT SETTINGS         I&O's Detail      LABS                        12.8   6.70  )-----------( 234      ( 20 Jul 2023 03:54 )             40.4     07-20    140  |  104  |  15  ----------------------------<  118<H>  3.9   |  25  |  0.78    Ca    9.4      20 Jul 2023 03:54  Phos  4.2     07-20  Mg     2.20     07-20    TPro  6.7  /  Alb  4.1  /  TBili  0.7  /  DBili  x   /  AST  21  /  ALT  22  /  AlkPhos  49  07-20    LIVER FUNCTIONS - ( 20 Jul 2023 03:54 )  Alb: 4.1 g/dL / Pro: 6.7 g/dL / ALK PHOS: 49 U/L / ALT: 22 U/L / AST: 21 U/L / GGT: x           PT/INR - ( 19 Jul 2023 17:10 )   PT: 10.9 sec;   INR: 0.94 ratio         PTT - ( 19 Jul 2023 17:10 )  PTT:34.3 sec        Urinalysis Basic - ( 20 Jul 2023 03:54 )    Color: x / Appearance: x / SG: x / pH: x  Gluc: 118 mg/dL / Ketone: x  / Bili: x / Urobili: x   Blood: x / Protein: x / Nitrite: x   Leuk Esterase: x / RBC: x / WBC x   Sq Epi: x / Non Sq Epi: x / Bacteria: x      POCT Blood Glucose.: 103 mg/dL *H* (07-19-23 @ 16:53)        MEDICATIONS  (STANDING):  atorvastatin 80 milliGRAM(s) Oral at bedtime  chlorhexidine 2% Cloths 1 Application(s) Topical <User Schedule>  potassium chloride  10 mEq/100 mL IVPB 10 milliEquivalent(s) IV Intermittent once    MEDICATIONS  (PRN):      Allergies:  No Known Allergies

## 2023-07-20 NOTE — PROGRESS NOTE ADULT - ASSESSMENT
A/P: 73F with PMHx of HTN, DM, TIA (seen in ED 7/12 for L facial droop) who presents with LUE weakness x 1 day. Patient was within tpa window and CT head showed no hemorrhage. Received tpa in ED with noted gingival bleeding on exam. Patient was admitted to MICU for stroke workup and tpa management.     NEURO:  #Mental status: baseline  #Stroke s/p tpa   - AOx  - q1 neuro checks  - f/u MRI brain w/o contrast  - f/u TTE  - f/u repreat noncon CT in 24 hrs; earlier if mental status changes  - permissive HTN to 180/105 for 24 hrs then gradual normotension over 2-3 days  - avoid antiplatelets/AC's for 24 hrs post tpa  - 81 mg aspiring daily 24 hrs post tpa if repeat CT neg for hemorrhage  - head of bed > 30 degrees for aspiration prevention     CV:  #Hemodynamically stable  - telemetry  - atorvastatin 80 mg  - f/u lipid, a1c    PULM:  - satting well on RA    RENAL:  - Monitor I/O's     GI:  #Diet: NPO pending dysphagia screen  - speech and swallow eval if dysphagia screen fails     ENDO:  #DM2: HbA1c pending  - Insulin Sliding Scale    METABOLIC:  #Electrolyte abnormalities  - Replete lytes as needed     HEMATOLOGIC:  #DVT prophylaxis w/ SCDs only   - f/u CBC, CMP, coags, lipid panel, A1C    ID:  #Infection work up  - f/u B12, folate  - f/u syphilis    SKIN:  #Lines: peripheral lines intact    PT/OT eval.  A/P: 73F with PMHx of HTN, DM, TIA (seen in ED 7/12 for L facial droop) who presents with LUE weakness x 1 day. Patient was within tpa window and CT head showed no hemorrhage. Received tpa in ED with noted gingival bleeding on exam. Patient was admitted to MICU for stroke workup and tpa management.     NEURO:  #Mental status: baseline  #Stroke s/p tpa at 05:40 pm on 7/19  - AOx 3   -- CT/A/brain/n, CT perfusion -- no bleeding, no infarction, Proximal R ICA-- 60 % stenosis,   - q1 neuro checks, repeat CTH 24 hrs post tenecteplase at 05:30 pm today  - f/u MRI brain w/o contrast  - f/u TTE with bubble study,   - Repeat noncon CTH if mental status changes  - permissive HTN to 180/105 for 24 hrs then gradual normotension over 2-3 days__ SBP-- 140's   - avoid antiplatelets/AC's for 24 hrs post tpa  - start ASA 81 mg daily and DVT prophylaxis if 24 hrs post tpa repeat CTH neg for hemorrhage  - head of bed > 30 degrees for aspiration prevention   - -pt is ambulating with assistance with no issues     CV:  #Hemodynamically stable  - telemetry  - atorvastatin 80 mg, lipid profile is normal, __ decreased to 40 mg   -- total chol 144, LDL-- 59,  Hgba1c -- 6.4     PULM:  - satting well on RA, no acute issues     RENAL:  - Monitor I/O's   -- BUN/Creat 15/0.78, pt is putting out good UOP     GI:  # speech pathologist evaluated pt and recommended Regular solids with thin liquids, tolerating well     ENDO:  #DM2: HbA1c 6.4, >118   - mild Insulin Sliding Scale    METABOLIC:  #Electrolyte abnormalities  - Replete lytes as needed     HEMATOLOGIC:  #DVT prophylaxis w/ SCDs only;   - start chemical DVT prophylaxis as per neuro recs if repeat CTH at 05:30 pm ( 24 hrs post TPA) is negative for bleeding    - pt c/o mild gum bleeding, but states that it is chronic; resolved the present tiime   -- - f/u B12, folate,    ID:  #Infection work up  + UA with mod leuk;s and WBC 25-50, pt denies any urinary complaints     f/u syphilis screening     SKIN:  #Lines: peripheral lines intact    PT/OT eval.   ETHICS: Full code

## 2023-07-20 NOTE — OCCUPATIONAL THERAPY INITIAL EVALUATION ADULT - PRECAUTIONS/LIMITATIONS, REHAB EVAL
Elevate Head of Bed (30 degrees)/aspiration precautions/cardiac precautions/fall precautions/seizure precautions

## 2023-07-20 NOTE — OCCUPATIONAL THERAPY INITIAL EVALUATION ADULT - RANGE OF MOTION EXAMINATION, UPPER EXTREMITY
except Left Wrist Flexion/Extension and Hand Flexion/Extension Active Assistive ROM WFL/bilateral UE Active ROM was WFL  (within functional limits)

## 2023-07-20 NOTE — OCCUPATIONAL THERAPY INITIAL EVALUATION ADULT - PHYSICAL ASSIST/NONPHYSICAL ASSIST: GROOMING, OT EVAL
Prescription approved per [facility]ill Protocol.     set-up required/verbal cues/nonverbal cues (demo/gestures)/1 person assist

## 2023-07-20 NOTE — SWALLOW BEDSIDE ASSESSMENT ADULT - ADDITIONAL RECOMMENDATIONS
1. Monitor for PO intake/tolerance   2. Monitor/reconsult for change in neurological status that may impact PO

## 2023-07-20 NOTE — OCCUPATIONAL THERAPY INITIAL EVALUATION ADULT - LIVES WITH, PROFILE
Pt. reports she lives with her  in a house with 3 steps to enter. Once inside, pt. reports she has full flight of steps to negotiate to 2nd floor where main bedroom and bathroom are located. Per pt., she has a shower stall in her bathroom.

## 2023-07-20 NOTE — OCCUPATIONAL THERAPY INITIAL EVALUATION ADULT - PERTINENT HX OF CURRENT PROBLEM, REHAB EVAL
Pt is a 73 year old female with hx of HTN, DM, migraines, and recent hospital visit on 7/12/23 for left facial drooping that resolved without any intervention. Pt left AMA and was told to get MRI but was not able to do so in the interim. Pt now presented to [facility] on 7/19/23 with Left UE weakness. Code stroke called and tPA given 7/19 at 17:40. CT Brain Scan on 7/19/23 displayed no acute intracranial bleeding, mass effect, or shift.

## 2023-07-20 NOTE — OCCUPATIONAL THERAPY INITIAL EVALUATION ADULT - GENERAL OBSERVATIONS, REHAB EVAL
Pt. received semisupine in bed. No acute distress. Patient agreed to evaluation from Occupational Therapist. +Heplock, +Tele. BP assessed: 174/87 mmHg, HR: 70 bpm, +O2 97%.

## 2023-07-20 NOTE — PROGRESS NOTE ADULT - CRITICAL CARE ATTENDING COMMENT
72 yo female w/ PMHx for HTN, DM, migraines (resolved) presents to ED w/ LUE weakness. Patient had possible TIA several weeks ago, declined intervention, left. Patient now presenting with LUE weakness. NIHSS of 2, s/p TNK. Patient admitted to MICU for hemodynamic monitoring, neurochecks and post TNK care.     # Acute CVA  - Q1H neuro check, permissive HTN, passed bedside dysphagia screen  - Pending MRI, 24 hour CTH or PRN for change in neuro status  - F/U A1c, TSH, Lipid pane, c/w statins  - PT/OT  - ASA if 24 hour CTH is negative  - Tele monitoring  - Monitor for post TNK bleed and angioedema  - DVT ppx- SCD until negative 24 hour CTH   - Dispo- full code

## 2023-07-21 LAB
ALBUMIN SERPL ELPH-MCNC: 4.2 G/DL — SIGNIFICANT CHANGE UP (ref 3.3–5)
ALP SERPL-CCNC: 53 U/L — SIGNIFICANT CHANGE UP (ref 40–120)
ALT FLD-CCNC: 22 U/L — SIGNIFICANT CHANGE UP (ref 4–33)
ANION GAP SERPL CALC-SCNC: 10 MMOL/L — SIGNIFICANT CHANGE UP (ref 7–14)
AST SERPL-CCNC: 18 U/L — SIGNIFICANT CHANGE UP (ref 4–32)
BILIRUB SERPL-MCNC: 0.8 MG/DL — SIGNIFICANT CHANGE UP (ref 0.2–1.2)
BUN SERPL-MCNC: 16 MG/DL — SIGNIFICANT CHANGE UP (ref 7–23)
CALCIUM SERPL-MCNC: 9.1 MG/DL — SIGNIFICANT CHANGE UP (ref 8.4–10.5)
CHLORIDE SERPL-SCNC: 107 MMOL/L — SIGNIFICANT CHANGE UP (ref 98–107)
CO2 SERPL-SCNC: 22 MMOL/L — SIGNIFICANT CHANGE UP (ref 22–31)
CREAT SERPL-MCNC: 0.75 MG/DL — SIGNIFICANT CHANGE UP (ref 0.5–1.3)
EGFR: 83 ML/MIN/1.73M2 — SIGNIFICANT CHANGE UP
FOLATE SERPL-MCNC: 16.2 NG/ML — SIGNIFICANT CHANGE UP (ref 3.1–17.5)
GLUCOSE BLDC GLUCOMTR-MCNC: 106 MG/DL — HIGH (ref 70–99)
GLUCOSE BLDC GLUCOMTR-MCNC: 106 MG/DL — HIGH (ref 70–99)
GLUCOSE BLDC GLUCOMTR-MCNC: 127 MG/DL — HIGH (ref 70–99)
GLUCOSE BLDC GLUCOMTR-MCNC: 163 MG/DL — HIGH (ref 70–99)
GLUCOSE SERPL-MCNC: 111 MG/DL — HIGH (ref 70–99)
HCT VFR BLD CALC: 42.4 % — SIGNIFICANT CHANGE UP (ref 34.5–45)
HCV AB S/CO SERPL IA: 0.09 S/CO — SIGNIFICANT CHANGE UP (ref 0–0.99)
HCV AB SERPL-IMP: SIGNIFICANT CHANGE UP
HGB BLD-MCNC: 13.6 G/DL — SIGNIFICANT CHANGE UP (ref 11.5–15.5)
MAGNESIUM SERPL-MCNC: 2.4 MG/DL — SIGNIFICANT CHANGE UP (ref 1.6–2.6)
MCHC RBC-ENTMCNC: 28.3 PG — SIGNIFICANT CHANGE UP (ref 27–34)
MCHC RBC-ENTMCNC: 32.1 GM/DL — SIGNIFICANT CHANGE UP (ref 32–36)
MCV RBC AUTO: 88.3 FL — SIGNIFICANT CHANGE UP (ref 80–100)
NRBC # BLD: 0 /100 WBCS — SIGNIFICANT CHANGE UP (ref 0–0)
NRBC # FLD: 0 K/UL — SIGNIFICANT CHANGE UP (ref 0–0)
PHOSPHATE SERPL-MCNC: 3.9 MG/DL — SIGNIFICANT CHANGE UP (ref 2.5–4.5)
PLATELET # BLD AUTO: 251 K/UL — SIGNIFICANT CHANGE UP (ref 150–400)
POTASSIUM SERPL-MCNC: 4.2 MMOL/L — SIGNIFICANT CHANGE UP (ref 3.5–5.3)
POTASSIUM SERPL-SCNC: 4.2 MMOL/L — SIGNIFICANT CHANGE UP (ref 3.5–5.3)
PROT SERPL-MCNC: 6.7 G/DL — SIGNIFICANT CHANGE UP (ref 6–8.3)
RBC # BLD: 4.8 M/UL — SIGNIFICANT CHANGE UP (ref 3.8–5.2)
RBC # FLD: 12.6 % — SIGNIFICANT CHANGE UP (ref 10.3–14.5)
SODIUM SERPL-SCNC: 139 MMOL/L — SIGNIFICANT CHANGE UP (ref 135–145)
T PALLIDUM AB TITR SER: NEGATIVE — SIGNIFICANT CHANGE UP
VIT B12 SERPL-MCNC: 1279 PG/ML — HIGH (ref 200–900)
WBC # BLD: 5.38 K/UL — SIGNIFICANT CHANGE UP (ref 3.8–10.5)
WBC # FLD AUTO: 5.38 K/UL — SIGNIFICANT CHANGE UP (ref 3.8–10.5)

## 2023-07-21 PROCEDURE — 99233 SBSQ HOSP IP/OBS HIGH 50: CPT

## 2023-07-21 PROCEDURE — 93306 TTE W/DOPPLER COMPLETE: CPT | Mod: 26

## 2023-07-21 PROCEDURE — 70450 CT HEAD/BRAIN W/O DYE: CPT | Mod: 26

## 2023-07-21 RX ORDER — ASPIRIN/CALCIUM CARB/MAGNESIUM 324 MG
81 TABLET ORAL DAILY
Refills: 0 | Status: DISCONTINUED | OUTPATIENT
Start: 2023-07-21 | End: 2023-07-23

## 2023-07-21 RX ORDER — ENOXAPARIN SODIUM 100 MG/ML
40 INJECTION SUBCUTANEOUS EVERY 24 HOURS
Refills: 0 | Status: DISCONTINUED | OUTPATIENT
Start: 2023-07-22 | End: 2023-07-23

## 2023-07-21 RX ORDER — ATORVASTATIN CALCIUM 80 MG/1
20 TABLET, FILM COATED ORAL AT BEDTIME
Refills: 0 | Status: DISCONTINUED | OUTPATIENT
Start: 2023-07-21 | End: 2023-07-23

## 2023-07-21 RX ORDER — SENNA PLUS 8.6 MG/1
2 TABLET ORAL AT BEDTIME
Refills: 0 | Status: DISCONTINUED | OUTPATIENT
Start: 2023-07-21 | End: 2023-07-23

## 2023-07-21 RX ORDER — POLYETHYLENE GLYCOL 3350 17 G/17G
17 POWDER, FOR SOLUTION ORAL DAILY
Refills: 0 | Status: DISCONTINUED | OUTPATIENT
Start: 2023-07-21 | End: 2023-07-23

## 2023-07-21 RX ADMIN — Medication 650 MILLIGRAM(S): at 14:42

## 2023-07-21 RX ADMIN — SENNA PLUS 2 TABLET(S): 8.6 TABLET ORAL at 22:00

## 2023-07-21 RX ADMIN — ENOXAPARIN SODIUM 40 MILLIGRAM(S): 100 INJECTION SUBCUTANEOUS at 06:05

## 2023-07-21 RX ADMIN — Medication 650 MILLIGRAM(S): at 02:16

## 2023-07-21 RX ADMIN — ATORVASTATIN CALCIUM 20 MILLIGRAM(S): 80 TABLET, FILM COATED ORAL at 22:01

## 2023-07-21 RX ADMIN — POLYETHYLENE GLYCOL 3350 17 GRAM(S): 17 POWDER, FOR SOLUTION ORAL at 11:55

## 2023-07-21 RX ADMIN — Medication 81 MILLIGRAM(S): at 11:55

## 2023-07-21 RX ADMIN — CHLORHEXIDINE GLUCONATE 1 APPLICATION(S): 213 SOLUTION TOPICAL at 06:05

## 2023-07-21 RX ADMIN — Medication 650 MILLIGRAM(S): at 03:00

## 2023-07-21 NOTE — CHART NOTE - NSCHARTNOTEFT_GEN_A_CORE
MICU Transfer Note    Transfer from: MICU    Transfer to: (  ) Medicine    (  ) Telemetry    (  ) RCU                               (  ) Palliative    (x) Stroke Unit    (  ) MICU    (  ) __________________    Accepting Physician:    Signout given to:     HPI / MICU COURSE:  72 yo female w/ PMHx for HTN, DM, migraines (resolved) presents to ED w/ LUE weakness. Patient reports that around 3:30 PM she was carrying a box when she felt weakness and heaviness in her LUE. She took two 325 mg aspirin and symptoms did not resolve. Reports that she "couldn't control my hand" and unable to open/close her fist. Neighbor drove the patient to the ED and code stroke was called. This morning she reports having a headache but did not take anything for it. Currently denies headache, changes in vision/hearing, numbness in extremities, chest pain, SOB, abdominal pain, N/V/D/C, dysuria.     Patient was last seen in the ED on 7/12 for left facial drooping that resolved without any intervention. Patient reports that she had the left facial droop and endorsed vision changes "seeing zig-zags." Reports that her symptoms improved and nearly resolved while still in ED. Patient left AMA and was told to get MRI but was not able to do so in the interim.     In the ED. patient was found to be within the window for tpa. CT head was negative for hemorrhage. Patient received tpa at around 5:40 PM. Patient was admitted to MICU for monitoring s/p tpa iso stroke.     In MICU LUE extremity weakness improved. 24 hour CTH stable. Started on ASA and lvx DVT ppx. Stable for transfer to floors.       Vital Signs Last 24 Hrs  T(C): 36.4 (20 Jul 2023 20:00), Max: 37.3 (20 Jul 2023 18:00)  T(F): 97.5 (20 Jul 2023 20:00), Max: 99.1 (20 Jul 2023 18:00)  HR: 69 (20 Jul 2023 23:00) (64 - 84)  BP: 137/90 (20 Jul 2023 23:00) (130/74 - 171/88)  BP(mean): 104 (20 Jul 2023 23:00) (84 - 119)  RR: 17 (20 Jul 2023 23:00) (15 - 22)  SpO2: 97% (20 Jul 2023 23:00) (97% - 100%)    Parameters below as of 20 Jul 2023 23:00  Patient On (Oxygen Delivery Method): room air        I&O's Summary    20 Jul 2023 07:01  -  21 Jul 2023 00:39  --------------------------------------------------------  IN: 240 mL / OUT: 1400 mL / NET: -1160 mL      LABS:                               12.8   6.70  )-----------( 234      ( 20 Jul 2023 03:54 )             40.4       07-20    140  |  104  |  15  ----------------------------<  118<H>  3.9   |  25  |  0.78    Ca    9.4      20 Jul 2023 03:54  Phos  4.2     07-20  Mg     2.20     07-20    TPro  6.7  /  Alb  4.1  /  TBili  0.7  /  DBili  x   /  AST  21  /  ALT  22  /  AlkPhos  49  07-20      PT/INR - ( 19 Jul 2023 17:10 )   PT: 10.9 sec;   INR: 0.94 ratio         PTT - ( 19 Jul 2023 17:10 )  PTT:34.3 sec      ASSESSMENT & PLAN:   NEURO:  #Mental status: baseline  #Stroke s/p tpa at 05:40 pm on 7/19  - AOx 3   -- CT/A/brain/n, CT perfusion -- no bleeding, no infarction, Proximal R ICA-- 60 % stenosis,   - q1 neuro checks, repeat CTH 24 hrs post tenecteplase at 05:30 pm today  - f/u MRI brain w/o contrast  - f/u TTE with bubble study,   - Repeat noncon CTH if mental status changes  - permissive HTN to 180/105 for 24 hrs then gradual normotension over 2-3 days__ SBP-- 140's   - avoid antiplatelets/AC's for 24 hrs post tpa  - 24hr post-tpa CTH negative. ASA and lvx dvt ppx started.   - head of bed > 30 degrees for aspiration prevention   - pt is ambulating with assistance with no issues     CV:  #Hemodynamically stable  - telemetry  - atorvastatin 80 mg, lipid profile is normal, __ decreased to 40 mg   -- total chol 144, LDL-- 59,  Hgba1c -- 6.4     PULM:  - satting well on RA, no acute issues     RENAL:  - Monitor I/O's   -- BUN/Creat 15/0.78, pt is putting out good UOP     GI:  # speech pathologist evaluated pt and recommended Regular solids with thin liquids, tolerating well     ENDO:  #DM2: HbA1c 6.4, >118   - mild Insulin Sliding Scale    METABOLIC:  #Electrolyte abnormalities  - Replete lytes as needed     HEMATOLOGIC:  #DVT prophylaxis   - 24 hr CTH negative for bleed, started on ppx lvx   - pt c/o mild gum bleeding, but states that it is chronic; resolved the present tiime   -- - f/u B12, folate,    ID:  #Infection work up  + UA with mod leuk;s and WBC 25-50, pt denies any urinary complaints     f/u syphilis screening     SKIN:  #Lines: peripheral lines intact    PT/OT eval.   ETHICS: Full code        FOR FOLLOW UP:  [ ] Follow up MRI Head  [ ] Follow up TTE w/ bubble  [ ] Follow up B12, folate, TSH, Syphillis   [ ] Follow up Neuro recs   [ ] PT/OT     Tommy Austin PA-C MICU Transfer Note    Transfer from: MICU    Transfer to: ( x ) Medicine    (  ) Telemetry    (  ) RCU                               (  ) Palliative    (x) Stroke Unit    (  ) MICU    (  ) __________________    Accepting Physician: Nahum Marmolejo     Signout given to: Nahum Marmolejo MAR     HPI / MICU COURSE:  74 yo female w/ PMHx for HTN, DM, migraines (resolved) presents to ED w/ LUE weakness. Patient reports that around 3:30 PM she was carrying a box when she felt weakness and heaviness in her LUE. She took two 325 mg aspirin and symptoms did not resolve. Reports that she "couldn't control my hand" and unable to open/close her fist. Neighbor drove the patient to the ED and code stroke was called. This morning she reports having a headache but did not take anything for it. Currently denies headache, changes in vision/hearing, numbness in extremities, chest pain, SOB, abdominal pain, N/V/D/C, dysuria.     Patient was last seen in the ED on 7/12 for left facial drooping that resolved without any intervention. Patient reports that she had the left facial droop and endorsed vision changes "seeing zig-zags." Reports that her symptoms improved and nearly resolved while still in ED. Patient left AMA and was told to get MRI but was not able to do so in the interim.     In the ED. patient was found to be within the window for tpa. CT head was negative for hemorrhage. Patient received tpa at around 5:40 PM. Patient was admitted to MICU for monitoring s/p tpa iso stroke.     In MICU LUE extremity weakness improved. 24 hour CTH stable. Started on ASA and lvx DVT ppx. Stable for transfer to floors.       Vital Signs Last 24 Hrs  T(C): 36.4 (20 Jul 2023 20:00), Max: 37.3 (20 Jul 2023 18:00)  T(F): 97.5 (20 Jul 2023 20:00), Max: 99.1 (20 Jul 2023 18:00)  HR: 69 (20 Jul 2023 23:00) (64 - 84)  BP: 137/90 (20 Jul 2023 23:00) (130/74 - 171/88)  BP(mean): 104 (20 Jul 2023 23:00) (84 - 119)  RR: 17 (20 Jul 2023 23:00) (15 - 22)  SpO2: 97% (20 Jul 2023 23:00) (97% - 100%)    Parameters below as of 20 Jul 2023 23:00  Patient On (Oxygen Delivery Method): room air        I&O's Summary    20 Jul 2023 07:01  -  21 Jul 2023 00:39  --------------------------------------------------------  IN: 240 mL / OUT: 1400 mL / NET: -1160 mL      LABS:                               12.8   6.70  )-----------( 234      ( 20 Jul 2023 03:54 )             40.4       07-20    140  |  104  |  15  ----------------------------<  118<H>  3.9   |  25  |  0.78    Ca    9.4      20 Jul 2023 03:54  Phos  4.2     07-20  Mg     2.20     07-20    TPro  6.7  /  Alb  4.1  /  TBili  0.7  /  DBili  x   /  AST  21  /  ALT  22  /  AlkPhos  49  07-20      PT/INR - ( 19 Jul 2023 17:10 )   PT: 10.9 sec;   INR: 0.94 ratio         PTT - ( 19 Jul 2023 17:10 )  PTT:34.3 sec      ASSESSMENT & PLAN:   NEURO:  #Mental status: baseline  #Stroke s/p tpa at 05:40 pm on 7/19  - AOx 3   -- CT/A/brain/n, CT perfusion -- no bleeding, no infarction, Proximal R ICA-- 60 % stenosis,   - q1 neuro checks, repeat CTH 24 hrs post tenecteplase at 05:30 pm today  - f/u MRI brain w/o contrast  - f/u TTE with bubble study,   - Repeat noncon CTH if mental status changes  - permissive HTN to 180/105 for 24 hrs then gradual normotension over 2-3 days__ SBP-- 140's   - avoid antiplatelets/AC's for 24 hrs post tpa  - 24hr post-tpa CTH negative. ASA and lvx dvt ppx started.   - head of bed > 30 degrees for aspiration prevention   - pt is ambulating with assistance with no issues     CV:  #Hemodynamically stable  - telemetry  - atorvastatin 80 mg, lipid profile is normal, __ decreased to 40 mg   -- total chol 144, LDL-- 59,  Hgba1c -- 6.4     PULM:  - satting well on RA, no acute issues     RENAL:  - Monitor I/O's   -- BUN/Creat 15/0.78, pt is putting out good UOP     GI:  # speech pathologist evaluated pt and recommended Regular solids with thin liquids, tolerating well     ENDO:  #DM2: HbA1c 6.4, >118   - mild Insulin Sliding Scale    METABOLIC:  #Electrolyte abnormalities  - Replete lytes as needed     HEMATOLOGIC:  #DVT prophylaxis   - 24 hr CTH negative for bleed, started on ppx lvx   - pt c/o mild gum bleeding, but states that it is chronic; resolved the present tiime   -- - f/u B12, folate,    ID:  #Infection work up  + UA with mod leuk;s and WBC 25-50, pt denies any urinary complaints     f/u syphilis screening     SKIN:  #Lines: peripheral lines intact    PT/OT eval.   ETHICS: Full code        FOR FOLLOW UP:  [ ] Follow up MRI Head  [ ]  TTE w/ bubble was inconclusive for PFO, consider repeat in few days   [ ] Follow up Syphilis screen,   [ ] Follow up Neuro recs, normotension after 05:40 pm    [ ] PT/OT     Tommy Austin PA-C MICU Transfer Note    Transfer from: MICU    Transfer to: ( x ) Medicine    (  ) Telemetry    (  ) RCU                               (  ) Palliative    (x) Stroke Unit    (  ) MICU    (  ) __________________    Accepting Physician: Nahum Marmolejo     Signout given to: Nahum Marmolejo MAR     HPI / MICU COURSE:  72 yo female w/ PMHx for HTN, DM, migraines (resolved) presents to ED w/ LUE weakness. Patient reports that around 3:30 PM she was carrying a box when she felt weakness and heaviness in her LUE. She took two 325 mg aspirin and symptoms did not resolve. Reports that she "couldn't control my hand" and unable to open/close her fist. Neighbor drove the patient to the ED and code stroke was called. This morning she reports having a headache but did not take anything for it. Currently denies headache, changes in vision/hearing, numbness in extremities, chest pain, SOB, abdominal pain, N/V/D/C, dysuria.     Patient was last seen in the ED on 7/12 for left facial drooping that resolved without any intervention. Patient reports that she had the left facial droop and endorsed vision changes "seeing zig-zags." Reports that her symptoms improved and nearly resolved while still in ED. Patient left AMA and was told to get MRI but was not able to do so in the interim.     In the ED. patient was found to be within the window for tpa. CT head was negative for hemorrhage. Patient received tpa at around 5:40 PM. Patient was admitted to MICU for monitoring s/p tpa iso stroke.     In MICU LUE extremity weakness improved. 24 hour CTH stable. Started on ASA and lvx DVT ppx. Stable for transfer to floors.       Vital Signs Last 24 Hrs  T(C): 36.4 (20 Jul 2023 20:00), Max: 37.3 (20 Jul 2023 18:00)  T(F): 97.5 (20 Jul 2023 20:00), Max: 99.1 (20 Jul 2023 18:00)  HR: 69 (20 Jul 2023 23:00) (64 - 84)  BP: 137/90 (20 Jul 2023 23:00) (130/74 - 171/88)  BP(mean): 104 (20 Jul 2023 23:00) (84 - 119)  RR: 17 (20 Jul 2023 23:00) (15 - 22)  SpO2: 97% (20 Jul 2023 23:00) (97% - 100%)    Parameters below as of 20 Jul 2023 23:00  Patient On (Oxygen Delivery Method): room air        I&O's Summary    20 Jul 2023 07:01  -  21 Jul 2023 00:39  --------------------------------------------------------  IN: 240 mL / OUT: 1400 mL / NET: -1160 mL      LABS:                               12.8   6.70  )-----------( 234      ( 20 Jul 2023 03:54 )             40.4       07-20    140  |  104  |  15  ----------------------------<  118<H>  3.9   |  25  |  0.78    Ca    9.4      20 Jul 2023 03:54  Phos  4.2     07-20  Mg     2.20     07-20    TPro  6.7  /  Alb  4.1  /  TBili  0.7  /  DBili  x   /  AST  21  /  ALT  22  /  AlkPhos  49  07-20      PT/INR - ( 19 Jul 2023 17:10 )   PT: 10.9 sec;   INR: 0.94 ratio         PTT - ( 19 Jul 2023 17:10 )  PTT:34.3 sec      ASSESSMENT & PLAN:   NEURO:  #Mental status: baseline  #Stroke s/p tpa at 05:40 pm on 7/19  - AOx 3   -- CT/A/brain/n, CT perfusion -- no bleeding, no infarction, Proximal R ICA-- 60 % stenosis,   - q1 neuro checks, repeat CTH 24 hrs post tenecteplase at 05:30 pm today  - f/u MRI brain w/o contrast  - f/u TTE with bubble study,   - Repeat noncon CTH if mental status changes  - permissive HTN to 180/105 for 24 hrs then gradual normotension over 2-3 days__ SBP-- 140's   - avoid antiplatelets/AC's for 24 hrs post tpa  - 24hr post-tpa CTH negative. ASA and lvx dvt ppx started.   - head of bed > 30 degrees for aspiration prevention   - pt is ambulating with assistance with no issues     CV:  #Hemodynamically stable  - telemetry  - atorvastatin 80 mg, lipid profile is normal, __ decreased to 40 mg   -- total chol 144, LDL-- 59,  Hgba1c -- 6.4     PULM:  - satting well on RA, no acute issues     RENAL:  - Monitor I/O's   -- BUN/Creat 15/0.78, pt is putting out good UOP     GI:  # speech pathologist evaluated pt and recommended Regular solids with thin liquids, tolerating well     ENDO:  #DM2: HbA1c 6.4, >118   - mild Insulin Sliding Scale    METABOLIC:  #Electrolyte abnormalities  - Replete lytes as needed     HEMATOLOGIC:  #DVT prophylaxis   - 24 hr CTH negative for bleed, started on ppx lvx   - pt c/o mild gum bleeding, but states that it is chronic; resolved the present tiime   -- - f/u B12, folate,    ID:  #Infection work up  + UA with mod leuk;s and WBC 25-50, pt denies any urinary complaints     f/u syphilis screening     SKIN:  #Lines: peripheral lines intact    PT/OT eval.   ETHICS: Full code        FOR FOLLOW UP:  [ ] Follow up MRI Head  [ ]  TTE w/ bubble was inconclusive for PFO, consider repeat in few days   [ ] Follow up Syphilis screen,   [ ] Follow up Neuro recs, gradual normotension after 05:40 pm today. pt is on Toprol XL 50 mg daily and Losartan 100 mg at home,   [] repeat CTH if acute neuro changes   [ ] PT/OT - follow up    Tommy Austin PA-C

## 2023-07-21 NOTE — PROGRESS NOTE ADULT - SUBJECTIVE AND OBJECTIVE BOX
INTERVAL HISTORY: Patient seen at bedside by stroke team. Daughter at bedside as well. Primary team obtained [facility] this morning for fluctuating weakness of the distal LUE. Results as below. Patient reports she was doing well with her distal LUE strength yesterday but this morning feels the weakness returning. Discussed management moving forward.     PAST MEDICAL & SURGICAL HISTORY:  Hypertension      Diabetes      History of migraine      No Past Surgical History        FAMILY HISTORY:    SOCIAL HISTORY:   T/E/D:   Occupation:   Lives with:     MEDICATIONS (HOME):  Home Medications:  aspirin 81 mg oral tablet: 1 orally once a day (19 Jul 2023 20:25)  atorvastatin 20 mg oral tablet: 1 orally once a day (19 Jul 2023 20:23)  losartan 100 mg oral tablet: 1 orally once a day (19 Jul 2023 20:23)  Metformin 800 mg once daily:  (19 Jul 2023 20:24)  metprolol 50 mg once daily:  (19 Jul 2023 20:23)  Multivitamin:  (19 Jul 2023 20:25)    MEDICATIONS  (STANDING):  aspirin enteric coated 81 milliGRAM(s) Oral daily  atorvastatin 40 milliGRAM(s) Oral at bedtime  chlorhexidine 2% Cloths 1 Application(s) Topical <User Schedule>  dextrose 5%. 1000 milliLiter(s) (50 mL/Hr) IV Continuous <Continuous>  dextrose 5%. 1000 milliLiter(s) (100 mL/Hr) IV Continuous <Continuous>  dextrose 50% Injectable 25 Gram(s) IV Push once  dextrose 50% Injectable 12.5 Gram(s) IV Push once  dextrose 50% Injectable 25 Gram(s) IV Push once  glucagon  Injectable 1 milliGRAM(s) IntraMuscular once  insulin lispro (ADMELOG) corrective regimen sliding scale   SubCutaneous at bedtime  insulin lispro (ADMELOG) corrective regimen sliding scale   SubCutaneous three times a day before meals  polyethylene glycol 3350 17 Gram(s) Oral daily  senna 2 Tablet(s) Oral at bedtime    MEDICATIONS  (PRN):  acetaminophen     Tablet .. 650 milliGRAM(s) Oral every 6 hours PRN Mild Pain (1 - 3), Moderate Pain (4 - 6)  dextrose Oral Gel 15 Gram(s) Oral once PRN Blood Glucose LESS THAN 70 milliGRAM(s)/deciliter    ALLERGIES/INTOLERANCES:  Allergies  No Known Allergies    Intolerances    VITALS & EXAMINATION:  Vital Signs Last 24 Hrs  T(C): 35.8 (21 Jul 2023 08:00), Max: 37.3 (20 Jul 2023 18:00)  T(F): 96.4 (21 Jul 2023 08:00), Max: 99.1 (20 Jul 2023 18:00)  HR: 74 (21 Jul 2023 12:00) (62 - 84)  BP: 157/106 (21 Jul 2023 10:00) (126/76 - 174/85)  BP(mean): 120 (21 Jul 2023 10:00) (89 - 120)  RR: 20 (21 Jul 2023 12:00) (12 - 21)  SpO2: 100% (21 Jul 2023 12:00) (97% - 100%)    Parameters below as of 21 Jul 2023 12:00  Patient On (Oxygen Delivery Method): room air        General:  Constitutional: Female, appears stated age, in no apparent distress including pain  Head: Normocephalic & Atraumatic.  Respiratory: Breathing comfortably.    Neurological:  MS: Eyes open. Awake, alert, oriented to person, place, situation, time. Follows all commands, including crossed commands.     Language: Speech is fluent with good repetition & comprehension.    CNs: VFF. EOMI no nystagmus. V1-3 intact to LT b/l. No facial asymmetry b/l, full eye closure strength b/l. No dysarthria.    Motor: Normal muscle bulk & tone. No noticeable tremor. No drift of UE or LE b/l. No pronator drift.               Deltoid	Biceps	Triceps	  Wrist Ext  Finger Abd.  R	   5	  5	     5	 5	  5	    5	 	  L	   5	  5	     5	 4-	  4- 	    3   	    	H-Flex	K-Flex	K-Ext	D-Flex	P-Flex  R	    5	  5	  5	   5	    5		   L	    5	  5	  5	   5	    5		     Sensation: Intact to LT b/l throughout.     Cortical: Extinction on DSS (neglect): none    Coordination: No dysmetria to FTN b/l.     Gait: Deferred.     LABORATORY:  CBC                       13.6   5.38  )-----------( 251      ( 21 Jul 2023 00:57 )             42.4     Chem 07-21    139  |  107  |  16  ----------------------------<  111<H>  4.2   |  22  |  0.75    Ca    9.1      21 Jul 2023 00:57  Phos  3.9     07-21  Mg     2.40     07-21    TPro  6.7  /  Alb  4.2  /  TBili  0.8  /  DBili  x   /  AST  18  /  ALT  22  /  AlkPhos  53  07-21    LFTs LIVER FUNCTIONS - ( 21 Jul 2023 00:57 )  Alb: 4.2 g/dL / Pro: 6.7 g/dL / ALK PHOS: 53 U/L / ALT: 22 U/L / AST: 18 U/L / GGT: x           Coagulopathy PT/INR - ( 19 Jul 2023 17:10 )   PT: 10.9 sec;   INR: 0.94 ratio         PTT - ( 19 Jul 2023 17:10 )  PTT:34.3 sec  Lipid Panel 07-20 Chol 144 LDL -- HDL 65 Trig 100  A1c   Cardiac enzymes     U/A Urinalysis Basic - ( 21 Jul 2023 00:57 )    Color: x / Appearance: x / SG: x / pH: x  Gluc: 111 mg/dL / Ketone: x  / Bili: x / Urobili: x   Blood: x / Protein: x / Nitrite: x   Leuk Esterase: x / RBC: x / WBC x   Sq Epi: x / Non Sq Epi: x / Bacteria: x      CSF  Immunological  Other    STUDIES & IMAGING:  Studies (EKG, EEG, EMG, etc):     Radiology (XR, CT, MR, U/S, TTE/JOHNATHAN):    [facility] 7/21:    "This exam is compared with prior head CT performed on July 20, 2023    Parenchymal volume loss and chronic microvascular ischemic changes are   identified    There is no acute hemorrhage mass or mass effect seen    Evaluation of the osseous structures with the appropriate window appears   unremarkable    Hyperostosis from talus interna is seen.    The visualized paranasal sinuses mastoid and middle ear regions appear   clear.    IMPRESSION: No evidence of acute hemorrhage mass or mass effect.    --- End of Report ---"      Carotid Dopplers:    VELOCITY:  ------------------------------------------------------------------------  RIGHT:    Subclavian: 74 /    Prox CCA: 72/16    Mid CCA: 49/11    Dist CCA: 48/11    Prox ICA: 91/28    Mid ICA: 91 / 29    Distal ICA: 104 / 36    ECA: 107 / 15    Vertebral: 40 / 14    ICA/CCA: 1.4  ------------------------------------------------------------------------    Subclavian: Normal    CCA Plaque: Minimal    ICA Plaque: Mild    Vertebral: Antegrade flow  ------------------------------------------------------------------------  LEFT:    Subclavian: 69 /    Prox CCA: 62/14    Mid CCA: 48/15    Dist CCA: 58/12    Prox ICA: 78/22    Mid ICA: 90 / 31    Distal ICA: 59 / 22    ECA: 91 / 13    Vertebral: 44 / 12    ICA/CCA: 1.4  ------------------------------------------------------------------------    Subclavian: Normal    CCA Plaque: Minimal    ICA Plaque: Mild    Vertebral: Antegrade flow  ------------------------------------------------------------------------  Right Findings: Right Common Carotid Artery: There is  minimal heterogenous plaque noted throughout the vessel.  Right Internal Carotid Artery:  B-mode and spectral  analyses consistent with a diameter reduction of 16-49%  (upper end of range). There is mild heterogenous plaque  within the proximal vessel. No hemodynamically significant  stenosis.  Right Vertebral Artery:  Antegrade flow with normal  velocities.  Left Findings: Left Common Carotid Artery: There is  minimal heterogenous plaque noted throughout the vessel.  Left Internal Carotid Artery:  B-mode and spectral  analyses consistent with diameter reduction of 16-49%  (upper end of range). There is mild heterogenous plaque  within the proximal vessel. No hemodynamically significant  stenosis. Tortuous vessel.  Left Vertebral Artery:  Antegrade flow with normal  velocities.  ------------------------------------------------------------------------  Summary/Impressions:  Mild heterogenous plaque noted within the proximal right  and left internal carotid arteries, consistent with 16-49%  stenoses in both proximal vessels (upper end of range).  No hemodynamically significant stenoses noted.  ------------------------------------------------------------------------  Confirmed on  7/20/2023 - 5:41 PM by SHOSHANA Clayton  By signing this report, the attending physician certifies  that he or she has personally supervised and interpreted  the vascular study and has reviewed and or edited and  agrees with the written comments contained within the  report.      TTE:     Ejection Fraction (Modified Lugo Rule): 74 %  ------------------------------------------------------------------------  OBSERVATIONS:  Mitral Valve: Normal mitral valve. Minimal mitral  regurgitation.  Aortic Root: Normal aortic root.  Aortic Valve: Calcified trileaflet aortic valve with normal  opening.  Left Atrium: Normal left atrium.  LA volume index = 24  cc/m2.  Left Ventricle: Endocardium not well visualized; grossly  normal left ventricular systolic function. Normal left  ventricular internal dimensions and wall thicknesses. Mild  diastolic dysfunction (Stage I).  Right Heart: Normal right atrium. The right ventricleis  not well visualized; grossly normal right ventricular  systolic function. Normal tricuspid valve. Normal pulmonic  valve. Minimal pulmonic regurgitation.  Pericardium/PleuraNormal pericardium with no pericardial  effusion.  Hemodynamic: Unable toassess for PFO despite the use of  agitated saline.  ------------------------------------------------------------------------  CONCLUSIONS:  1. Calcified trileaflet aortic valve with normal opening.  2. Endocardium not well visualized; grossly normalleft  ventricular systolic function.  3. Mild diastolic dysfunction (Stage I).  4. The right ventricle is not well visualized; grossly  normal right ventricular systolic function.  5. Unable to assess for PFO despite the use of agitated  saline.  ***No previous Echo exam.  ------------------------------------------------------------------------  Confirmed on  7/21/2023 - 11:00:41 by Viky Weaver MD  ------------------------------------------------------------------------    [facility] 7/20:    FINDINGS: The study is degraded by motion artifact  VENTRICLES AND SULCI:  Normal.  INTRA-AXIAL:  Areas of white matter hypodensity are seen within the   cerebral hemispheres bilaterally compatible with mild microvascular-type   changes. No mass, blood or abnormal attenuation is otherwise seen.  EXTRA-AXIAL:  No mass or collection is seen.  VISUALIZED SINUSES:  Clear.  VISUALIZED MASTOIDS:  Clear.  CALVARIUM:  Normal.  MISCELLANEOUS:  None.    IMPRESSION: Motion degraded exam.    No significant change from the prior exam.    No mass effect, hemorrhage or acute pathology identified.    --- End of Report ---      CT PERFUSION demonstrated: No core infarct or penumbra.  If symptoms persist consider follow up head CT or MRI, MRA  if no   contraindication.    CTA COW:  Patent intracranial circulation without flow limiting stenosis.   Patulous right MCA trifurcation. No aneurysm.    CTA NECK: Similar mild stenosis at the proximal right internal carotid   artery of approximately 60% by NASCET criteria. Bilateral vertebral   arteries are patent without flow limiting stenosis.    CTH 7/19: No acute intracranial bleeding, mass effect, or shift.

## 2023-07-21 NOTE — CHART NOTE - NSCHARTNOTEFT_GEN_A_CORE
MAR Accept Note      Patient seen and examined.   Labs and data reviewed.   No findings precluding transfer of service.       HPI/MICU COURSE:   Please refer to MICU transfer note for full details. Briefly, this is a 74 yo female w/ PMHx for HTN, DM, migraines (resolved) presents to ED w/ LUE weakness. Patient reports that around 3:30 PM she was carrying a box when she felt weakness and heaviness in her LUE. She took two 325 mg aspirin and symptoms did not resolve. Reports that she "couldn't control my hand" and unable to open/close her fist. Neighbor drove the patient to the ED and code stroke was called. This morning she reports having a headache but did not take anything for it. Currently denies headache, changes in vision/hearing, numbness in extremities, chest pain, SOB, abdominal pain, N/V/D/C, dysuria.     Patient was last seen in the ED on 7/12 for left facial drooping that resolved without any intervention. Patient reports that she had the left facial droop and endorsed vision changes "seeing zig-zags." Reports that her symptoms improved and nearly resolved while still in ED. Patient left AMA and was told to get MRI but was not able to do so in the interim.     In the ED. patient was found to be within the window for tpa. CT head was negative for hemorrhage. Patient received tpa at around 5:40 PM. Patient was admitted to MICU for monitoring s/p tpa iso stroke.     In MICU LUE extremity weakness improved. 24 hour CTH stable. Started on ASA and lvx DVT ppx. Stable for transfer to floors.       FOR FOLLOW-UP:    [ ] Follow up MRI Head  [ ] Follow up TTE w/ bubble  [ ] Follow up B12, folate, TSH, Syphillis   [ ] Follow up Neuro recs   [ ] PT/OT     Jean Andrews  Internal Medicine, PGY3

## 2023-07-21 NOTE — PROGRESS NOTE ADULT - NS ATTEND AMEND GEN_ALL_CORE FT
72 yo female w/ PMHx for HTN, DM, migraines (resolved) presents to ED w/ LUE weakness. Patient had possible TIA several weeks ago, declined intervention, left. Patient now presenting with LUE weakness. NIHSS of 2, s/p TNK. Patient admitted to MICU for hemodynamic monitoring, neurochecks and post TNK care.     HA and worsening strength in the left hand. Stat CTH without acute findings.     # Acute CVA  - Q1H neuro check, permissive HTN, passed bedside dysphagia screen  - Pending MRI, 24 hour CTH or PRN for change in neuro status  - A1c 6.4, TSH wnl , Lipid panel, c/w statins  - PT/OT  - C/W ASA  - Tele monitoring  - Monitor for post TNK bleed and angioedema  - DVT ppx- Lovenox  - Dispo- full code.

## 2023-07-21 NOTE — PROGRESS NOTE ADULT - ASSESSMENT
73y (1950) wo/man with a PMHx significant for HTN, DM, migraines, TIA last week (not on any AC/AP daily) presents to ED as a code stroke for LUE numbness and distal LUE weakness. LKW 3:30 PM 7/19/23. Pt folding a box around 3:30 PM when she noted sudden on weakness in her LUE. Pt states she took one baby ASA during that time. Still with symptoms in ED.  CT negative for hemorrhage. Pt received tenecteplase at 5:40 PM today given disabling symptom, after the contraindications were reviewed- did not meet any contraindications. Pt was explained the risks/benefits of the medication (prior to her receiving it) and pt agreed for the treatment. rCTHs stable, questionable R perirolandic hypodensities; pending MRI brain w/o contrast. TTE negative for embolic source. Carotid Dopplers demonstrate only mild R ICA stenosis (especially when considering the velocities). Exam stable.     Impression: Fluctuating LUE monomelic sensorimotor syndrome possibly due to R brain dysfunction from acute ischemic stroke of unknown mechanism. Pending further workup.    Recommendations     [] Pending MRI brain w/o contrast   [] Can restart ASA 81 mg indefinitely  [] C/w home Atorvastatin 20 mg qhs (LDL of 59)   [] Chemical DVT prophylaxis   [] Neurochecks q4hr   [] BP goals: Gradual normotension, avoid hypotension if possible. Goals today between 140-160/<90 mmHg.  [] Home OT; pending PT evaluation  [] Diet: Regular diet as tolerated.   [] HgA1C goals < 7.0 (Currently 6.4)  [] Lifestyle modifications: smoking cessation, exercise, and a Mediterranean style diet.   [] Patient should follow up with Stroke NP, Beata Gallegos or Deanne Chi, in clinic at [address] [place] [address]. Please email [email or web address] w/ basic PHI.     Neurology will continue to follow.  To be staffed by general neurology attending, Dr. Marie on morning rounds.     Please call with questions: [phone]

## 2023-07-21 NOTE — PROGRESS NOTE ADULT - ASSESSMENT
73F with PMHx of HTN, DM, TIA (seen in ED 7/12 for L facial droop) who presents with LUE weakness x 1 day. Patient was within tpa window and CT head showed no hemorrhage. Received tpa in ED with noted gingival bleeding on exam. Patient was admitted to MICU for stroke workup and tpa management.     NEURO:  #Mental status: baseline  #Stroke s/p tpa at 05:40 pm on 7/19  - AOx 3   -- CT/A/brain/n, CT perfusion -- no bleeding, no infarction, Proximal R ICA-- 60 % stenosis,   - q1 neuro checks, repeat CTH 24 hrs post tenecteplase at 05:30 pm today  - f/u MRI brain w/o contrast  - f/u TTE with bubble study,   - Repeat noncon CTH if mental status changes  - permissive HTN to 180/105 for 24 hrs then gradual normotension over 2-3 days__ SBP-- 140's   - avoid antiplatelets/AC's for 24 hrs post tpa  - start ASA 81 mg daily and DVT prophylaxis if 24 hrs post tpa repeat CTH neg for hemorrhage  - head of bed > 30 degrees for aspiration prevention   - -pt is ambulating with assistance with no issues     CV:  #Hemodynamically stable  - telemetry  - atorvastatin 80 mg, lipid profile is normal, __ decreased to 40 mg   -- total chol 144, LDL-- 59,  Hgba1c -- 6.4     PULM:  - satting well on RA, no acute issues     RENAL:  - Monitor I/O's   -- BUN/Creat 15/0.78, pt is putting out good UOP     GI:  # speech pathologist evaluated pt and recommended Regular solids with thin liquids, tolerating well     ENDO:  #DM2: HbA1c 6.4, >118   - mild Insulin Sliding Scale    METABOLIC:  #Electrolyte abnormalities  - Replete lytes as needed     HEMATOLOGIC:  #DVT prophylaxis w/ SCDs only;   - start chemical DVT prophylaxis as per neuro recs if repeat CTH at 05:30 pm ( 24 hrs post TPA) is negative for bleeding    - pt c/o mild gum bleeding, but states that it is chronic; resolved the present tiime   -- - f/u B12, folate,    ID:  #Infection work up  + UA with mod leuk;s and WBC 25-50, pt denies any urinary complaints     f/u syphilis screening     SKIN:  #Lines: peripheral lines intact    PT/OT eval.   ETHICS: Full code         73F with PMHx of HTN, DM, TIA (seen in ED 7/12 for L facial droop) who presents with LUE weakness x 1 day. Patient was within tpa window and CT head showed no hemorrhage. Received tpa in ED with noted gingival bleeding on exam. Patient was admitted to MICU for stroke workup and tpa management.     NEURO:  #Mental status: baseline  #Stroke s/p tpa at 05:40 pm on 7/19  - AOx 3   -- CT/A/brain/n, CT perfusion -- no bleeding, no infarction, Proximal R ICA-- 60 % stenosis,   - q 4 neuro checks, repeat CTH 24 hrs post tenecteplase -- stable  --  7/21-- repeat CTH for acute changes: temporal HA and worsening L arm weakness -- stable   - f/u MRI brain w/o contrast  -  TTE with bubble study,-- inconclusive for PFO, consider repeating TTE with bubble to R/O PFO   - Repeat noncon CTH if mental status/ acute neuro changes  - permissive HTN to 180/105 for 24 hrs until 05:40 pm today as per neuro recs,  then gradual normotension     _keep SBP <180 and DBP < 110 for now,   - Continue ASA 81 mg daily and Lovenox for DVT prophylaxis, Lipitor 20 mg daily,   - head of bed > 30 degrees for aspiration prevention   - -pt is ambulating with assistance with no issues, PT consult pending, OT consult in place     CV:  #Hemodynamically stable  - telemetry  - atorvastatin 20 mg, lipid profile is normal, _  -- total chol 144, LDL-- 59,  Hgba1c -- 6.4     PULM:  - satting well on RA, no acute issues     RENAL:  - Monitor I/O's   -- BUN/Creat 15/0.78, good UOP -- 1.4 L + not counted UOP     GI:  # speech pathologist evaluated pt and recommended Regular solids with thin liquids, tolerating well     ENDO:  #DM2: HbA1c 6.4, >118   - mild Insulin Sliding Scale    METABOLIC:  #Electrolyte abnormalities  - Replete lytes as needed     HEMATOLOGIC:  #DVT prophylaxis w/ SCDs only;   - continue Lovenox 40 mg daily   no bleeding   -- - f/u B12,-- 1279, folate, 16, syphilis screen in lab     ID:  #Infection work up  + UA with mod leuk;s and WBC 25-50, pt denies any urinary complaints, observe off Abx     f/u syphilis screening --  as per neuro recs     SKIN:  #Lines: peripheral lines intact    PT/OT eval.   ETHICS: Full code    __ spoke with pt's daughter and updated her about her mom status and transfer

## 2023-07-21 NOTE — PROGRESS NOTE ADULT - NS ATTEND AMEND GEN_ALL_CORE FT
I interviewed the patient, discussed the case with the PA and agree with the findings and plan as documented in the PA's note except below.  Patient continue to have left upper extremity weakness mainly distal forearm and left hand.   Etiology of LUE sensorimotor syndrome is uncertain, perhaps may be due to vascular etiology like the small vessels disease (lacunar stroke ).   MRI brain is pending.   Continue medical management, neuro- check.  Continue PT/OT  GI and DVT prophylaxis.  I discussed the diagnosis and treatment plan with the patient. All questions and concerns were addressed. The patient and mother demonstrated good understanding of the treatment plan.  If you have any further questions, please do not hesitate to contact our team.  Thank you for allowing us to participate in this patient care. I interviewed the patient, discussed the case with the PA and agree with the findings and plan as documented in the PA's note except below.  Patient continue to have left upper extremity weakness mainly forearm and left hand.   Etiology of LUE monomelic sensorimotor syndrome is uncertain, perhaps may be due to vascular etiology like the small vessels disease (lacunar stroke ).   MRI brain is pending.   Continue medical management, neuro- check.  Continue PT/OT  GI and DVT prophylaxis.  I discussed the diagnosis and treatment plan with the patient. All questions and concerns were addressed. The patient and mother demonstrated good understanding of the treatment plan.  If you have any further questions, please do not hesitate to contact our team.  Thank you for allowing us to participate in this patient care.

## 2023-07-21 NOTE — PROGRESS NOTE ADULT - SUBJECTIVE AND OBJECTIVE BOX
Significant recent/past 24 hr events:    Subjective:    Review of Systems         [ ] A ten-point review of systems was otherwise negative except as noted.  [ ] Due to altered mental status/intubation, subjective information were not able to be obtained from the patient. History was obtained, to the extent possible, from review of the chart and collateral sources of information.      Patient is a 73y old  Female who presents with a chief complaint of LUE weakness (20 Jul 2023 07:10)    HPI:  74 yo female w/ PMHx for HTN, DM, migraines (resolved) presents to ED w/ LUE weakness. Patient reports that around 3:30 PM she was carrying a box when she felt weakness and heaviness in her LUE. She took two 325 mg aspirin and symptoms did not resolve. Reports that she "couldn't control my hand" and unable to open/close her fist. Neighbor drove the patient to the ED and code stroke was called. This morning she reports having a headache but did not take anything for it. Currently denies headache, changes in vision/hearing, numbness in extremities, chest pain, SOB, abdominal pain, N/V/D/C, dysuria.     Patient was last seen in the ED on 7/12 for left facial drooping that resolved without any intervention. Patient reports that she had the left facial droop and endorsed vision changes "seeing zig-zags." Reports that her symptoms improved and nearly resolved while still in ED. Patient left AMA and was told to get MRI but was not able to do so in the interim.     In the ED. patient was found to be within the window for tpa. CT head was negative for hemorrhage. Patient received tpa at around 5:40 PM. Patient was admitted to MICU for monitoring s/p tpa iso stroke.  (19 Jul 2023 18:03)    PAST MEDICAL & SURGICAL HISTORY:  Hypertension      Diabetes      History of migraine      No Past Surgical History        FAMILY HISTORY:      Vitals   ICU Vital Signs Last 24 Hrs  T(C): 36.8 (21 Jul 2023 04:00), Max: 37.3 (20 Jul 2023 18:00)  T(F): 98.2 (21 Jul 2023 04:00), Max: 99.1 (20 Jul 2023 18:00)  HR: 68 (21 Jul 2023 05:00) (62 - 84)  BP: 152/73 (21 Jul 2023 05:00) (126/76 - 174/85)  BP(mean): 93 (21 Jul 2023 05:00) (89 - 119)  ABP: --  ABP(mean): --  RR: 18 (21 Jul 2023 05:00) (15 - 22)  SpO2: 100% (21 Jul 2023 05:00) (97% - 100%)    O2 Parameters below as of 21 Jul 2023 05:00  Patient On (Oxygen Delivery Method): room air            Physical Exam:   Constitutional: NAD, well-groomed, well-developed  HEENT: PERRLA, EOMI, no drainage or redness  Neck: supple,  No JVD, Trachea midline  Back: Normal spine flexure, No CVA tenderness, No deformity or limitation of movement  Respiratory: Breath Sounds equal & clear bilaterally to auscultation, no accessory muscle use noted  Cardiovascular: Regular rate, regular rhythm, normal S1, S2; no murmurs or rub  Gastrointestinal: Soft, non-tender, non distended, no hepatosplenomegaly, normal bowel sounds  Extremities: ORTIZ x 4, no peripheral edema, no cyanosis, no clubbing   Vascular: Equal and normal pulses: 2+ peripheral pulses throughout  Neurological: A+O x 3; speech clear and intact; no sensory, motor  deficits, normal reflexes  Psychiatric: calm, normal mood, normal affect  Musculoskeletal: No joint swelling or deformity; no limitation of movement  Skin: warm, dry, well perfused, no rashes    VENT SETTINGS         I&O's Detail    20 Jul 2023 07:01  -  21 Jul 2023 06:41  --------------------------------------------------------  IN:    Oral Fluid: 240 mL  Total IN: 240 mL    OUT:    Voided (mL): 1400 mL  Total OUT: 1400 mL    Total NET: -1160 mL          LABS                        13.6   5.38  )-----------( 251      ( 21 Jul 2023 00:57 )             42.4     07-21    139  |  107  |  16  ----------------------------<  111<H>  4.2   |  22  |  0.75    Ca    9.1      21 Jul 2023 00:57  Phos  3.9     07-21  Mg     2.40     07-21    TPro  6.7  /  Alb  4.2  /  TBili  0.8  /  DBili  x   /  AST  18  /  ALT  22  /  AlkPhos  53  07-21    LIVER FUNCTIONS - ( 21 Jul 2023 00:57 )  Alb: 4.2 g/dL / Pro: 6.7 g/dL / ALK PHOS: 53 U/L / ALT: 22 U/L / AST: 18 U/L / GGT: x           PT/INR - ( 19 Jul 2023 17:10 )   PT: 10.9 sec;   INR: 0.94 ratio         PTT - ( 19 Jul 2023 17:10 )  PTT:34.3 sec        Urinalysis Basic - ( 21 Jul 2023 00:57 )    Color: x / Appearance: x / SG: x / pH: x  Gluc: 111 mg/dL / Ketone: x  / Bili: x / Urobili: x   Blood: x / Protein: x / Nitrite: x   Leuk Esterase: x / RBC: x / WBC x   Sq Epi: x / Non Sq Epi: x / Bacteria: x      POCT Blood Glucose.: 105 mg/dL *H* (07-20-23 @ 21:59)  POCT Blood Glucose.: 113 mg/dL *H* (07-20-23 @ 17:48)  POCT Blood Glucose.: 147 mg/dL *H* (07-20-23 @ 12:46)        MEDICATIONS  (STANDING):  aspirin enteric coated 81 milliGRAM(s) Oral daily  atorvastatin 40 milliGRAM(s) Oral at bedtime  chlorhexidine 2% Cloths 1 Application(s) Topical <User Schedule>  dextrose 5%. 1000 milliLiter(s) (50 mL/Hr) IV Continuous <Continuous>  dextrose 5%. 1000 milliLiter(s) (100 mL/Hr) IV Continuous <Continuous>  dextrose 50% Injectable 25 Gram(s) IV Push once  dextrose 50% Injectable 12.5 Gram(s) IV Push once  dextrose 50% Injectable 25 Gram(s) IV Push once  enoxaparin Injectable 40 milliGRAM(s) SubCutaneous every 24 hours  glucagon  Injectable 1 milliGRAM(s) IntraMuscular once  insulin lispro (ADMELOG) corrective regimen sliding scale   SubCutaneous at bedtime  insulin lispro (ADMELOG) corrective regimen sliding scale   SubCutaneous three times a day before meals  polyethylene glycol 3350 17 Gram(s) Oral daily  senna 2 Tablet(s) Oral at bedtime    MEDICATIONS  (PRN):  acetaminophen     Tablet .. 650 milliGRAM(s) Oral every 6 hours PRN Mild Pain (1 - 3), Moderate Pain (4 - 6)  dextrose Oral Gel 15 Gram(s) Oral once PRN Blood Glucose LESS THAN 70 milliGRAM(s)/deciliter      Allergies:  No Known Allergies        CRITICAL CARE TIME SPENT:  minutes of critical care time spent providing medical care for patient's acute illness/conditions that impairs at least one vital organ system and/or poses a high risk of imminent or life threatening deterioration in the patient's condition. It includes time spent evaluating and treating the patient's acute illness as well as time spent reviewing labs, radiology, discussing goals of care with patient and/or patient's family, and discussing the case with a multidisciplinary team, in an effort to prevent further life threatening deterioration or end organ damage. This time is independent of any procedures performed.         Significant recent/past 24 hr events: repeat CTH for acute neuro changes with no acute findings     Subjective: pt c/o HA, temporal, B/L, 6/10, and worsening L hand strength, Pt denies any HA/other focal weakness/ paresthesias/other complaints.     Review of Systems         [ ] A ten-point review of systems was otherwise negative except as noted.  [ ] Due to altered mental status/intubation, subjective information were not able to be obtained from the patient. History was obtained, to the extent possible, from review of the chart and collateral sources of information.      Patient is a 73y old  Female who presents with a chief complaint of LUE weakness (20 Jul 2023 07:10)    HPI:  74 yo female w/ PMHx for HTN, DM, migraines (resolved) presents to ED w/ LUE weakness. Patient reports that around 3:30 PM she was carrying a box when she felt weakness and heaviness in her LUE. She took two 325 mg aspirin and symptoms did not resolve. Reports that she "couldn't control my hand" and unable to open/close her fist. Neighbor drove the patient to the ED and code stroke was called. This morning she reports having a headache but did not take anything for it. Currently denies headache, changes in vision/hearing, numbness in extremities, chest pain, SOB, abdominal pain, N/V/D/C, dysuria.     Patient was last seen in the ED on 7/12 for left facial drooping that resolved without any intervention. Patient reports that she had the left facial droop and endorsed vision changes "seeing zig-zags." Reports that her symptoms improved and nearly resolved while still in ED. Patient left AMA and was told to get MRI but was not able to do so in the interim.     In the ED. patient was found to be within the window for tpa. CT head was negative for hemorrhage. Patient received tpa at around 5:40 PM. Patient was admitted to MICU for monitoring s/p tpa iso stroke.  (19 Jul 2023 18:03)    PAST MEDICAL & SURGICAL HISTORY:  Hypertension      Diabetes      History of migraine      No Past Surgical History        FAMILY HISTORY:      Vitals   ICU Vital Signs Last 24 Hrs  T(C): 36.8 (21 Jul 2023 04:00), Max: 37.3 (20 Jul 2023 18:00)  T(F): 98.2 (21 Jul 2023 04:00), Max: 99.1 (20 Jul 2023 18:00)  HR: 68 (21 Jul 2023 05:00) (62 - 84)  BP: 152/73 (21 Jul 2023 05:00) (126/76 - 174/85)  BP(mean): 93 (21 Jul 2023 05:00) (89 - 119)  ABP: --  ABP(mean): --  RR: 18 (21 Jul 2023 05:00) (15 - 22)  SpO2: 100% (21 Jul 2023 05:00) (97% - 100%)    O2 Parameters below as of 21 Jul 2023 05:00  Patient On (Oxygen Delivery Method): room air            Physical Exam:   Constitutional: NAD, well-groomed, well-developed  HEENT: PERRLA, EOMI, no drainage or redness  Neck: supple,  No JVD, Trachea midline  Back: Normal spine flexure, No CVA tenderness, No deformity or limitation of movement  Respiratory: Breath Sounds equal & clear bilaterally to auscultation, no accessory muscle use noted  Cardiovascular: Regular rate, regular rhythm, normal S1, S2; no murmurs or rub  Gastrointestinal: Soft, non-tender, non distended, no hepatosplenomegaly, normal bowel sounds  Extremities: ORTIZ x 4, no peripheral edema, no cyanosis, no clubbing   Vascular: Equal and normal pulses: 2+ peripheral pulses throughout  Neurological: A+O x 3; speech clear and intact; motor strength-- L arm 4/5, deltoid-- 5/5 __  iliopsoas muscle - 5/5, pupils are equal and brisk response to light, no other sensory, motor  deficits, normal reflexes  Psychiatric: calm, normal mood, normal affect  Musculoskeletal: No joint swelling or deformity; no limitation of movement  Skin: warm, dry, well perfused, no rashes    VENT SETTINGS         I&O's Detail    20 Jul 2023 07:01  -  21 Jul 2023 06:41  --------------------------------------------------------  IN:    Oral Fluid: 240 mL  Total IN: 240 mL    OUT:    Voided (mL): 1400 mL  Total OUT: 1400 mL    Total NET: -1160 mL          LABS                        13.6   5.38  )-----------( 251      ( 21 Jul 2023 00:57 )             42.4     07-21    139  |  107  |  16  ----------------------------<  111<H>  4.2   |  22  |  0.75    Ca    9.1      21 Jul 2023 00:57  Phos  3.9     07-21  Mg     2.40     07-21    TPro  6.7  /  Alb  4.2  /  TBili  0.8  /  DBili  x   /  AST  18  /  ALT  22  /  AlkPhos  53  07-21    LIVER FUNCTIONS - ( 21 Jul 2023 00:57 )  Alb: 4.2 g/dL / Pro: 6.7 g/dL / ALK PHOS: 53 U/L / ALT: 22 U/L / AST: 18 U/L / GGT: x           PT/INR - ( 19 Jul 2023 17:10 )   PT: 10.9 sec;   INR: 0.94 ratio         PTT - ( 19 Jul 2023 17:10 )  PTT:34.3 sec        Urinalysis Basic - ( 21 Jul 2023 00:57 )    Color: x / Appearance: x / SG: x / pH: x  Gluc: 111 mg/dL / Ketone: x  / Bili: x / Urobili: x   Blood: x / Protein: x / Nitrite: x   Leuk Esterase: x / RBC: x / WBC x   Sq Epi: x / Non Sq Epi: x / Bacteria: x      POCT Blood Glucose.: 105 mg/dL *H* (07-20-23 @ 21:59)  POCT Blood Glucose.: 113 mg/dL *H* (07-20-23 @ 17:48)  POCT Blood Glucose.: 147 mg/dL *H* (07-20-23 @ 12:46)        MEDICATIONS  (STANDING):  aspirin enteric coated 81 milliGRAM(s) Oral daily  atorvastatin 40 milliGRAM(s) Oral at bedtime  chlorhexidine 2% Cloths 1 Application(s) Topical <User Schedule>  dextrose 5%. 1000 milliLiter(s) (50 mL/Hr) IV Continuous <Continuous>  dextrose 5%. 1000 milliLiter(s) (100 mL/Hr) IV Continuous <Continuous>  dextrose 50% Injectable 25 Gram(s) IV Push once  dextrose 50% Injectable 12.5 Gram(s) IV Push once  dextrose 50% Injectable 25 Gram(s) IV Push once  enoxaparin Injectable 40 milliGRAM(s) SubCutaneous every 24 hours  glucagon  Injectable 1 milliGRAM(s) IntraMuscular once  insulin lispro (ADMELOG) corrective regimen sliding scale   SubCutaneous at bedtime  insulin lispro (ADMELOG) corrective regimen sliding scale   SubCutaneous three times a day before meals  polyethylene glycol 3350 17 Gram(s) Oral daily  senna 2 Tablet(s) Oral at bedtime    MEDICATIONS  (PRN):  acetaminophen     Tablet .. 650 milliGRAM(s) Oral every 6 hours PRN Mild Pain (1 - 3), Moderate Pain (4 - 6)  dextrose Oral Gel 15 Gram(s) Oral once PRN Blood Glucose LESS THAN 70 milliGRAM(s)/deciliter      Allergies:  No Known Allergies

## 2023-07-22 DIAGNOSIS — Z29.9 ENCOUNTER FOR PROPHYLACTIC MEASURES, UNSPECIFIED: ICD-10-CM

## 2023-07-22 DIAGNOSIS — E11.9 TYPE 2 DIABETES MELLITUS WITHOUT COMPLICATIONS: ICD-10-CM

## 2023-07-22 DIAGNOSIS — R29.898 OTHER SYMPTOMS AND SIGNS INVOLVING THE MUSCULOSKELETAL SYSTEM: ICD-10-CM

## 2023-07-22 DIAGNOSIS — I10 ESSENTIAL (PRIMARY) HYPERTENSION: ICD-10-CM

## 2023-07-22 LAB
ALBUMIN SERPL ELPH-MCNC: 4.4 G/DL — SIGNIFICANT CHANGE UP (ref 3.3–5)
ALP SERPL-CCNC: 48 U/L — SIGNIFICANT CHANGE UP (ref 40–120)
ALT FLD-CCNC: 23 U/L — SIGNIFICANT CHANGE UP (ref 4–33)
ANION GAP SERPL CALC-SCNC: 13 MMOL/L — SIGNIFICANT CHANGE UP (ref 7–14)
AST SERPL-CCNC: 17 U/L — SIGNIFICANT CHANGE UP (ref 4–32)
BASOPHILS # BLD AUTO: 0.02 K/UL — SIGNIFICANT CHANGE UP (ref 0–0.2)
BASOPHILS NFR BLD AUTO: 0.3 % — SIGNIFICANT CHANGE UP (ref 0–2)
BILIRUB SERPL-MCNC: 0.8 MG/DL — SIGNIFICANT CHANGE UP (ref 0.2–1.2)
BUN SERPL-MCNC: 20 MG/DL — SIGNIFICANT CHANGE UP (ref 7–23)
CALCIUM SERPL-MCNC: 9.3 MG/DL — SIGNIFICANT CHANGE UP (ref 8.4–10.5)
CHLORIDE SERPL-SCNC: 105 MMOL/L — SIGNIFICANT CHANGE UP (ref 98–107)
CO2 SERPL-SCNC: 21 MMOL/L — LOW (ref 22–31)
CREAT SERPL-MCNC: 0.7 MG/DL — SIGNIFICANT CHANGE UP (ref 0.5–1.3)
EGFR: 91 ML/MIN/1.73M2 — SIGNIFICANT CHANGE UP
EOSINOPHIL # BLD AUTO: 0.12 K/UL — SIGNIFICANT CHANGE UP (ref 0–0.5)
EOSINOPHIL NFR BLD AUTO: 2.1 % — SIGNIFICANT CHANGE UP (ref 0–6)
GLUCOSE BLDC GLUCOMTR-MCNC: 107 MG/DL — HIGH (ref 70–99)
GLUCOSE BLDC GLUCOMTR-MCNC: 113 MG/DL — HIGH (ref 70–99)
GLUCOSE BLDC GLUCOMTR-MCNC: 127 MG/DL — HIGH (ref 70–99)
GLUCOSE BLDC GLUCOMTR-MCNC: 155 MG/DL — HIGH (ref 70–99)
GLUCOSE SERPL-MCNC: 139 MG/DL — HIGH (ref 70–99)
HCT VFR BLD CALC: 44.3 % — SIGNIFICANT CHANGE UP (ref 34.5–45)
HGB BLD-MCNC: 14.2 G/DL — SIGNIFICANT CHANGE UP (ref 11.5–15.5)
IANC: 3.69 K/UL — SIGNIFICANT CHANGE UP (ref 1.8–7.4)
IMM GRANULOCYTES NFR BLD AUTO: 0.3 % — SIGNIFICANT CHANGE UP (ref 0–0.9)
LYMPHOCYTES # BLD AUTO: 1.52 K/UL — SIGNIFICANT CHANGE UP (ref 1–3.3)
LYMPHOCYTES # BLD AUTO: 26.2 % — SIGNIFICANT CHANGE UP (ref 13–44)
MAGNESIUM SERPL-MCNC: 2.3 MG/DL — SIGNIFICANT CHANGE UP (ref 1.6–2.6)
MCHC RBC-ENTMCNC: 28.9 PG — SIGNIFICANT CHANGE UP (ref 27–34)
MCHC RBC-ENTMCNC: 32.1 GM/DL — SIGNIFICANT CHANGE UP (ref 32–36)
MCV RBC AUTO: 90.2 FL — SIGNIFICANT CHANGE UP (ref 80–100)
MONOCYTES # BLD AUTO: 0.43 K/UL — SIGNIFICANT CHANGE UP (ref 0–0.9)
MONOCYTES NFR BLD AUTO: 7.4 % — SIGNIFICANT CHANGE UP (ref 2–14)
NEUTROPHILS # BLD AUTO: 3.69 K/UL — SIGNIFICANT CHANGE UP (ref 1.8–7.4)
NEUTROPHILS NFR BLD AUTO: 63.7 % — SIGNIFICANT CHANGE UP (ref 43–77)
NRBC # BLD: 0 /100 WBCS — SIGNIFICANT CHANGE UP (ref 0–0)
NRBC # FLD: 0 K/UL — SIGNIFICANT CHANGE UP (ref 0–0)
PHOSPHATE SERPL-MCNC: 3.2 MG/DL — SIGNIFICANT CHANGE UP (ref 2.5–4.5)
PLATELET # BLD AUTO: 242 K/UL — SIGNIFICANT CHANGE UP (ref 150–400)
POTASSIUM SERPL-MCNC: 4.3 MMOL/L — SIGNIFICANT CHANGE UP (ref 3.5–5.3)
POTASSIUM SERPL-SCNC: 4.3 MMOL/L — SIGNIFICANT CHANGE UP (ref 3.5–5.3)
PROT SERPL-MCNC: 7.1 G/DL — SIGNIFICANT CHANGE UP (ref 6–8.3)
RBC # BLD: 4.91 M/UL — SIGNIFICANT CHANGE UP (ref 3.8–5.2)
RBC # FLD: 12.5 % — SIGNIFICANT CHANGE UP (ref 10.3–14.5)
SODIUM SERPL-SCNC: 139 MMOL/L — SIGNIFICANT CHANGE UP (ref 135–145)
WBC # BLD: 5.8 K/UL — SIGNIFICANT CHANGE UP (ref 3.8–10.5)
WBC # FLD AUTO: 5.8 K/UL — SIGNIFICANT CHANGE UP (ref 3.8–10.5)

## 2023-07-22 PROCEDURE — 99233 SBSQ HOSP IP/OBS HIGH 50: CPT

## 2023-07-22 RX ADMIN — ATORVASTATIN CALCIUM 20 MILLIGRAM(S): 80 TABLET, FILM COATED ORAL at 22:00

## 2023-07-22 RX ADMIN — CHLORHEXIDINE GLUCONATE 1 APPLICATION(S): 213 SOLUTION TOPICAL at 05:58

## 2023-07-22 RX ADMIN — POLYETHYLENE GLYCOL 3350 17 GRAM(S): 17 POWDER, FOR SOLUTION ORAL at 12:57

## 2023-07-22 RX ADMIN — ENOXAPARIN SODIUM 40 MILLIGRAM(S): 100 INJECTION SUBCUTANEOUS at 05:57

## 2023-07-22 RX ADMIN — Medication 81 MILLIGRAM(S): at 12:57

## 2023-07-22 NOTE — PHYSICAL THERAPY INITIAL EVALUATION ADULT - LIGHT TOUCH SENSATION, RUE, REHAB EVAL
[phone]. There is an Interventional Radiologist on call 24/7.         within normal limits

## 2023-07-22 NOTE — PROGRESS NOTE ADULT - ASSESSMENT
74 yo female w/ HTN, DM, presented to ED w/ LUE weakness, CTH neg for hemorrhage, s/p TPA and MICU monitoring, now downgraded to medicine floor. Continued LUE weakness, awaiting MRI head.

## 2023-07-22 NOTE — PHYSICAL THERAPY INITIAL EVALUATION ADULT - PERTINENT HX OF CURRENT PROBLEM, REHAB EVAL
73F with PMHx of HTN, DM, TIA (seen in ED 7/12 for L facial droop) who presents with LUE weakness x 1 day. Patient was within tpa window and CT head showed no hemorrhage. Received tpa in ED with noted gingival bleeding on exam. Patient was admitted to MICU for stroke workup and tpa management.

## 2023-07-22 NOTE — PROGRESS NOTE ADULT - TIME BILLING
Review of laboratory data, radiology results, consultant recommendations, EMR documentation, discussion with patient/advanced care providers and interdisciplinary staff.

## 2023-07-22 NOTE — PHYSICAL THERAPY INITIAL EVALUATION ADULT - REFERRAL TO ANOTHER SERVICE NEEDED, PT EVAL
Department of Anesthesiology  Postprocedure Note    Patient: Margie Flores  MRN: [id number]  YOB: 1944  Date of evaluation: 7/6/2023      Procedure Summary     Date: 07/06/23 Room / Location: [address] [place] [address] [facility]    Anesthesia Start: 7272 Anesthesia Stop: 8768    Procedure: PROCEDURE Diagnosis: Unspecified atrial fibrillation    Scheduled Providers:  Responsible Provider: Morenita Trevizo MD    Anesthesia Type: general ASA Status: 4          Anesthesia Type: No value filed.     Kiel Phase I:      Kiel Phase II:        Anesthesia Post Evaluation    Patient location during evaluation: PACU  Patient participation: complete - patient participated  Level of consciousness: awake and awake and alert  Pain score: 0  Airway patency: patent  Nausea & Vomiting: no nausea and no vomiting  Complications: no  Cardiovascular status: blood pressure returned to baseline and hemodynamically stable  Respiratory status: acceptable, spontaneous ventilation and face mask  Hydration status: euvolemic
occupational therapy

## 2023-07-22 NOTE — PROGRESS NOTE ADULT - SUBJECTIVE AND OBJECTIVE BOX
Patient is a 73y old  Female who presents with a chief complaint of LUE weakness (20 Jul 2023 07:10)    SUBJECTIVE / OVERNIGHT EVENTS: No acute events. Continued left hand weakness, improved from admission per patient. Also with continued headache, awaiting tylenol. No nausea, chest pain, shortness of breath, fever.     MEDICATIONS  (STANDING):  aspirin enteric coated 81 milliGRAM(s) Oral daily  atorvastatin 20 milliGRAM(s) Oral at bedtime  chlorhexidine 2% Cloths 1 Application(s) Topical <User Schedule>  dextrose 5%. 1000 milliLiter(s) (50 mL/Hr) IV Continuous <Continuous>  dextrose 5%. 1000 milliLiter(s) (100 mL/Hr) IV Continuous <Continuous>  dextrose 50% Injectable 25 Gram(s) IV Push once  dextrose 50% Injectable 12.5 Gram(s) IV Push once  dextrose 50% Injectable 25 Gram(s) IV Push once  enoxaparin Injectable 40 milliGRAM(s) SubCutaneous every 24 hours  glucagon  Injectable 1 milliGRAM(s) IntraMuscular once  insulin lispro (ADMELOG) corrective regimen sliding scale   SubCutaneous three times a day before meals  insulin lispro (ADMELOG) corrective regimen sliding scale   SubCutaneous at bedtime  polyethylene glycol 3350 17 Gram(s) Oral daily  senna 2 Tablet(s) Oral at bedtime    MEDICATIONS  (PRN):  acetaminophen     Tablet .. 650 milliGRAM(s) Oral every 6 hours PRN Mild Pain (1 - 3), Moderate Pain (4 - 6)  dextrose Oral Gel 15 Gram(s) Oral once PRN Blood Glucose LESS THAN 70 milliGRAM(s)/deciliter    CAPILLARY BLOOD GLUCOSE    POCT Blood Glucose.: 127 mg/dL (22 Jul 2023 09:17)  POCT Blood Glucose.: 163 mg/dL (21 Jul 2023 20:59)  POCT Blood Glucose.: 106 mg/dL (21 Jul 2023 17:39)  POCT Blood Glucose.: 106 mg/dL (21 Jul 2023 11:54)    I&O's Summary    PHYSICAL EXAM:  Vital Signs Last 24 Hrs  T(C): 36.7 (22 Jul 2023 06:00), Max: 36.7 (21 Jul 2023 14:20)  T(F): 98 (22 Jul 2023 06:00), Max: 98.1 (21 Jul 2023 14:20)  HR: 70 (22 Jul 2023 06:00) (70 - 82)  BP: 143/97 (22 Jul 2023 06:00) (143/97 - 169/90)  BP(mean): 116 (21 Jul 2023 12:00) (116 - 116)  RR: 18 (22 Jul 2023 06:00) (18 - 20)  SpO2: 100% (22 Jul 2023 06:00) (96% - 100%)    Parameters below as of 22 Jul 2023 06:00  Patient On (Oxygen Delivery Method): room air    CONSTITUTIONAL: NAD, sitting up in bed  EYES: conjunctiva and sclera clear  ENMT: Moist oral mucosa  NECK: Supple  RESPIRATORY: Normal respiratory effort; lungs are clear to auscultation bilaterally  CARDIOVASCULAR: Regular rate and rhythm, normal S1 and S2, No lower extremity edema; Peripheral pulses are 2+ bilaterally  ABDOMEN: Nontender to palpation, normoactive bowel sounds, no rebound/guarding  PSYCH: calm, affect appropriate  NEUROLOGY: left hand with 3-4/5 strength, remainder of strength/sensation intact  SKIN: No rashes; no palpable lesions    LABS:                        14.2   5.80  )-----------( 242      ( 22 Jul 2023 05:40 )             44.3     07-22    139  |  105  |  20  ----------------------------<  139<H>  4.3   |  21<L>  |  0.70    Ca    9.3      22 Jul 2023 05:40  Phos  3.2     07-22  Mg     2.30     07-22    TPro  7.1  /  Alb  4.4  /  TBili  0.8  /  DBili  x   /  AST  17  /  ALT  23  /  AlkPhos  48  07-22    Urinalysis Basic - ( 22 Jul 2023 05:40 )    Color: x / Appearance: x / SG: x / pH: x  Gluc: 139 mg/dL / Ketone: x  / Bili: x / Urobili: x   Blood: x / Protein: x / Nitrite: x   Leuk Esterase: x / RBC: x / WBC x   Sq Epi: x / Non Sq Epi: x / Bacteria: x    RADIOLOGY & ADDITIONAL TESTS: Reviewed    COORDINATION OF CARE:  Care Discussed with Consultants/Other Providers [Y- Neurology attending- plan for MRI, awaiting test, medicine ACP]

## 2023-07-22 NOTE — PHYSICAL THERAPY INITIAL EVALUATION ADULT - ADDITIONAL COMMENTS
Pt states she lives in a house with her  and dog and has 3 steps to enter and 1 flight to bedroom. Prior to admission, pt was ambulating independently without any assistive devices and still drives.  Pt with left UE weakness - states it is starting to feel better, the problem is mostly at her wrist now.   Post PT evaluation, pt left semi-supine, alarm on, call bell and remote within reach, all precautions maintained, NAD. RN aware.

## 2023-07-22 NOTE — PHYSICAL THERAPY INITIAL EVALUATION ADULT - LEVEL OF INDEPENDENCE: STAIR NEGOTIATION, REHAB EVAL
Gallbladder or Bile Duct Inflammation or Stone - Care Day 5 (8/16/2021) by Jillian Dakins, RN       Review Status Review Entered   Completed 8/17/2021 12:24      Criteria Review      Care Day: 5 Care Date: 8/16/2021 Level of Care: Inpatient Floor    Guideline Day 3    Clinical Status    ( ) * Hemodynamic stability    ( ) * Afebrile or temperature acceptable for next level of care    ( ) * Nausea and vomiting absent or controlled and acceptable for next level of care    ( ) * Pain absent or managed    ( ) * Laboratory test results normal or acceptable for next level of care    ( ) * Discharge plans and education understood    Activity    ( ) * Ambulatory or acceptable for next level of care    Routes    ( ) * Oral hydration    ( ) * Oral medication or regimen acceptable for next level of care    ( ) * Oral diet or acceptable for next level of care    Medications    ( ) * Antibiotics absent or administration arranged for next level of care    * Milestone   Additional Notes   CARE DAY 5     8/16/2021      [facility] Surgery Progress Note       SUBJECTIVE:   Ms. Parvin Alfonso is a 79 y.o. female is seen and examined at bedside.  Diet throughout the day yesterday.  Continues to feel better daily. She denies fever, chills, chest pain, SOB, n/v/d/c.         OBJECTIVE:   BP (!) 135/58   Pulse 66   Temp 96.2 °F (35.7 °C) (Temporal)   Resp 16   Ht 5' 3\" (1.6 m)   Wt 118 lb 2 oz (53.6 kg)   SpO2 98%   BMI 20.92 kg/m²    CONSTITUTIONAL: Alert, appropriate, no acute distress   SKIN: warm, dry with no rashes or lesions   HEENT: NCAT, Non icteric, PERR. Trachea Midline. CHEST/LUNGS: CTA bilaterally. Normal respiratory effort. CARDIOVASCULAR: RRR, No edema. ABDOMEN: Abdomen soft without tenderness to palpation. NEUROLOGIC: CN II-XI grossly intact, no motor or sensory deficits    MUSCULOSKELETAL: No clubbing or cyanosis. PSYCHIATRIC:  Normal Mood and Affect.  Alert and Oriented.    PLAN:   Continue conservative management N.p.o. this a.m. for HIDA scan   If obstruction noted patient will either require a cholecystostomy tube versus cholecystectomy   Continue antibiotics for possible cholecystitis   Would recommend nonoperative management if the patient tolerates as she is very high risk for surgical intervention due to CVA history and current CV status   If the gallbladder does show filling on HIDA scan would likely be stable to discharge if okay with medicine as the patient is now tolerating a diet and her pain is much better      Patient was seen this morning in her room, wondering when she can have something to eat.  Today she still having some right upper quadrant abdominal pain.  Denied any nausea or emesis.  Currently awaiting HIDA study      Physical Examination:   General: Well-developed, no acute distress lying comfortably in bed. HEENT: Atraumatic normocephalic, range of motion normal JVD, no tracheal deviation noted. Cardiac: Normal S1-S2 no murmurs rub or gallop. Respiratory: clear To auscultation bilaterally, no rhonchi or rales, no wheezing   Abdomen: Soft, positive bowel sounds in all quadrants, no distention, + tenderness to RUQ, no organomegaly noted, no rebound noted.     Extremities: no tenderness, no edema, moves all extremities   Psych: Affect normal and good eye contact, behavioral normal.          TOTALBILI 1.2   AST 39      Imaging Last 24 Hours:   NM HEPATOBILIARY    Impression: 1. HIDA scan shows non-filling of the gallbladder, concerning for mechanical cystic duct obstruction. Signed by Dr Jordan Senior           Acute cholecystitis   Initially on broad-spectrum antibiotics Zosyn and transition to Cipro and Flagyl.  Patient tolerating oral antibiotics well with improvement in clinical status. Evaluated by general surgery recommend no surgical intervention at this time given patient's advanced age and medical comorbidities.    HIDA scan obtained today which shows nonfilling of the gallbladder awaiting general surgery final recommendation.       Hyperglycemia   Patient carries prior history of type 2 diabetes however A1c noted to be 5.3. Monitor blood sugars in-house   Hypoglycemia protocol.       Fall at home prior to admission secondary to syncope   Echo with reduced LV function EF of 40% compared to prior echocardiogram.  Moderate global hypokinesis with grade 1 diastolic dysfunction and moderate aortic regurgitation. No complaints of chest pain or EKG acute changes in house.       History of stroke x2   Currently stable.       Hypertension: Initiated on nifedipine.           Code: Full   DVT prophylaxis: Enoxaparin   Diet. Disposition: Awaiting final general surgery recommendations.      Gallbladder or Bile Duct Inflammation or Stone - Care Day 3 (8/14/2021) by Amber Magallanes RN       Review Status Review Entered   Completed 8/17/2021 12:16      Criteria Review      Care Day: 3 Care Date: 8/14/2021 Level of Care: Inpatient Floor    Guideline Day 3    Clinical Status    ( ) * Hemodynamic stability    ( ) * Afebrile or temperature acceptable for next level of care    ( ) * Nausea and vomiting absent or controlled and acceptable for next level of care    ( ) * Pain absent or managed    ( ) * Laboratory test results normal or acceptable for next level of care    ( ) * Discharge plans and education understood    Activity    ( ) * Ambulatory or acceptable for next level of care    Routes    ( ) * Oral hydration    ( ) * Oral medication or regimen acceptable for next level of care    ( ) * Oral diet or acceptable for next level of care    Medications    ( ) * Antibiotics absent or administration arranged for next level of care    * Milestone   Additional Notes   CARE DAY 3     8/14/2021      SUBJECTIVE:  Patient states she was able to have some breakfast this morning. Continues to have some nausea and RUQ pain.    [facility]   The patient is a 79 y. o. female with past medical history of CKD, DM, HTN, CVA, and GERD who presented to [address] [place] [address] ED via EMS complaining of fall at home. Pt stated she felt dizzy and lightheaded and fell backwards and hit her head on the door. Reported immediate weakness and unable to get up, and had to call EMS for help. Reported SOB and vomiting with the fall as well. Pt also c/o RUQ abdominal pain since last 3-4 days. Also reported having intermittent dull chest pain since last 2 days without radiation. Stated she lives at [employer]. Uses walker at baseline. Workup in ED revealed K+ 5.5 on admission down to 4.0, troponin 0.08 on arrival trended down to 0.04. unremarkable chest Xray, CT head unremarkable, CT cervical spine negative for fracture. CT abdomen showed small amount of pericholecystic fluid with no definite gallstones, or gallbladder wall thickening, early cholecystitis cannot be excluded, Fatty infiltration of the liver, possibly a large duodenal diverticulum. Ultrasound of gallbladder suggesting cholecystitis, there is sludge in the gallbladder with a possible small adherent stone. Patient was admitted to hospital medicine for acute cholecystitis and syncope with general surgery consultation. General surgery recommends no surgical intervention at this time given her advanced age and CV status. Recommends continuing current conservative management with antibiotics. 8/14 general surgery recommended HIDA scan to ensure the gallbladder is emptying. Diet increased and patient tolerating well. Lars Rist showed reduced LV function, EF of 40%, mil-moderate global hypokinesis motion, grade 1 diastolic dysfunction, moderate aortic regurgitation, and mild tricuspid regurgitation.       Objective:   VITALS:  BP (!) 148/59   Pulse 52   Temp 97.7 °F (36.5 °C)   Resp 18   Ht 5' 3\" (1.6 m)   Wt 113 lb 9 oz (51.5 kg)   SpO2 96%   BMI 20.12 kg/m²          Abdominal:       General: Abdomen is flat. Bowel sounds are normal.       Palpations: Abdomen is soft.    Tenderness: There is abdominal tenderness. There is no guarding or rebound.       · lactated ringers 100 mL/hr at 08/13/21 2202   · sodium chloride    · dextrose           Scheduled Medications   · cefepime 2,000 mg Intravenous Q24H   · metroNIDAZOLE 500 mg Intravenous Q8H   · NIFEdipine 60 mg Oral Daily   · aspirin 81 mg Oral Daily   · atorvastatin 20 mg Oral Daily   · sodium chloride flush 5-40 mL Intravenous 2 times per day   · enoxaparin 40 mg Subcutaneous Daily         TOTAL BILI 1.8   CREAT 1.1      Micro: following blood cultures, no growth to date       Assessment/Plan   Principal Problem:     Acute cholecystitis-                - General surgery following                            - recommends no surgical intervention at this time given her advanced age and CV status                           - also recommends continuing current conservative management with antibiotics.                            -recommends HIDA scan               - cefepime and flagyl               - IVFs               - Monitor labs closely                 -increased diet           Active Problems:     Fall at home 2/2 Syncope-                - ECHO showed reduced LV function, EF of 40%, mil-moderate global hypokinesis motion, grade 1 diastolic dysfunction, moderate aortic regurgitation, and mild tricuspid regurgitation.                - EKG               - Fall precautions                -PT/OT             Essential hypertension- noted, stable at this time, continue current medications          Type 2 diabetes mellitus with circulatory disorder ([address]- HgbA1c 5.3, d/c SSI.  Accu-Cheks, hypoglycemia treatment protocol in place         History of stroke- noted, continue current medication           Antibiotic: cefepime and flagyl       DVT Prophylaxis: Lovenox          [facility] General Surgery Progress Note   SUBJECTIVE:   Ms. Sherrell Elias is a 79 y.o. female is seen and examined at bedside.  Still having some abdominal pain yet she did tolerate her diet this a.m.  Ate grits and juice and denies any nausea or vomiting.  Denies chest pain.  Denies fevers or chills. She denies fever, chills, chest pain, SOB, n/v/d/c.         OBJECTIVE:   BP (!) 153/60   Pulse 65   Temp 97.9 °F (36.6 °C)   Resp 18   Ht 5' 3\" (1.6 m)   Wt 113 lb 9 oz (51.5 kg)   SpO2 97%   BMI 20.12 kg/m²    CONSTITUTIONAL: Alert, appropriate, no acute distress   SKIN: warm, dry with no rashes or lesions   HEENT: NCAT, Non icteric, PERR. Trachea Midline. CHEST/LUNGS: CTA bilaterally. Normal respiratory effort. CARDIOVASCULAR: RRR, No edema. ABDOMEN: Mild to moderate tenderness to palpation epigastrium and right upper quadrant without guarding, rebound or rigidity   NEUROLOGIC: CN II-XI grossly intact, no motor or sensory deficits    MUSCULOSKELETAL: No clubbing or cyanosis. PSYCHIATRIC:  Normal Mood and Affect. Alert and Oriented.       ASSESSMENT:   Principal Problem:     Acute cholecystitis   Active Problems:     Essential hypertension     Type 2 diabetes mellitus with circulatory disorder (HCC)     History of stroke     Syncope     Fall at home   Resolved Problems:     * No resolved hospital problems.  *           PLAN:   Continue conservative management               OK for diet at this time   We will get a HIDA scan to rule out biliary obstruction   If obstruction noted patient will either require a cholecystostomy tube versus cholecystectomy   Continue antibiotics for possible cholecystitis   Would recommend nonoperative management if the patient tolerates as she is very high risk for surgical intervention due to CVA history and current CV status supervision

## 2023-07-22 NOTE — PHYSICAL THERAPY INITIAL EVALUATION ADULT - MANUAL MUSCLE TESTING RESULTS, REHAB EVAL
Bilateral UE grossly >/= 3/5, Left UE < right UE bilateral LE grossly >/= 3+/5/grossly assessed due to

## 2023-07-23 ENCOUNTER — TRANSCRIPTION ENCOUNTER (OUTPATIENT)
Age: 73
End: 2023-07-23

## 2023-07-23 VITALS
DIASTOLIC BLOOD PRESSURE: 71 MMHG | RESPIRATION RATE: 16 BRPM | HEART RATE: 76 BPM | OXYGEN SATURATION: 99 % | TEMPERATURE: 98 F | SYSTOLIC BLOOD PRESSURE: 143 MMHG

## 2023-07-23 LAB
GLUCOSE BLDC GLUCOMTR-MCNC: 107 MG/DL — HIGH (ref 70–99)
GLUCOSE BLDC GLUCOMTR-MCNC: 135 MG/DL — HIGH (ref 70–99)

## 2023-07-23 PROCEDURE — 99239 HOSP IP/OBS DSCHRG MGMT >30: CPT

## 2023-07-23 PROCEDURE — 70553 MRI BRAIN STEM W/O & W/DYE: CPT | Mod: 26

## 2023-07-23 RX ORDER — SENNA PLUS 8.6 MG/1
2 TABLET ORAL
Qty: 0 | Refills: 0 | DISCHARGE
Start: 2023-07-23

## 2023-07-23 RX ORDER — LOSARTAN POTASSIUM 100 MG/1
1 TABLET, FILM COATED ORAL
Refills: 0 | DISCHARGE

## 2023-07-23 RX ORDER — METOPROLOL TARTRATE 50 MG
1 TABLET ORAL
Qty: 30 | Refills: 0
Start: 2023-07-23 | End: 2023-08-21

## 2023-07-23 RX ORDER — ACETAMINOPHEN 500 MG
1000 TABLET ORAL ONCE
Refills: 0 | Status: COMPLETED | OUTPATIENT
Start: 2023-07-23 | End: 2023-07-23

## 2023-07-23 RX ORDER — ATORVASTATIN CALCIUM 80 MG/1
1 TABLET, FILM COATED ORAL
Refills: 0 | DISCHARGE

## 2023-07-23 RX ORDER — ATORVASTATIN CALCIUM 80 MG/1
1 TABLET, FILM COATED ORAL
Qty: 30 | Refills: 0
Start: 2023-07-23 | End: 2023-08-21

## 2023-07-23 RX ORDER — LOSARTAN POTASSIUM 100 MG/1
1 TABLET, FILM COATED ORAL
Qty: 30 | Refills: 0
Start: 2023-07-23 | End: 2023-08-21

## 2023-07-23 RX ORDER — POLYETHYLENE GLYCOL 3350 17 G/17G
17 POWDER, FOR SOLUTION ORAL
Qty: 0 | Refills: 0 | DISCHARGE
Start: 2023-07-23

## 2023-07-23 RX ADMIN — Medication 650 MILLIGRAM(S): at 06:40

## 2023-07-23 RX ADMIN — POLYETHYLENE GLYCOL 3350 17 GRAM(S): 17 POWDER, FOR SOLUTION ORAL at 11:38

## 2023-07-23 RX ADMIN — CHLORHEXIDINE GLUCONATE 1 APPLICATION(S): 213 SOLUTION TOPICAL at 05:42

## 2023-07-23 RX ADMIN — Medication 400 MILLIGRAM(S): at 09:19

## 2023-07-23 RX ADMIN — Medication 81 MILLIGRAM(S): at 11:37

## 2023-07-23 RX ADMIN — Medication 650 MILLIGRAM(S): at 05:40

## 2023-07-23 RX ADMIN — ENOXAPARIN SODIUM 40 MILLIGRAM(S): 100 INJECTION SUBCUTANEOUS at 05:37

## 2023-07-23 NOTE — PROGRESS NOTE ADULT - SUBJECTIVE AND OBJECTIVE BOX
Patient is a 73y old  Female who presents with a chief complaint of left upper extremity weakness (22 Jul 2023 11:11)    SUBJECTIVE / OVERNIGHT EVENTS: No acute events. Reports headache earlier this morning, received pain medication, now headache is completely resolved per patient. Still with left hand weakness, strength appears to fluctuate slightly from time to time.     MEDICATIONS  (STANDING):  aspirin enteric coated 81 milliGRAM(s) Oral daily  atorvastatin 20 milliGRAM(s) Oral at bedtime  chlorhexidine 2% Cloths 1 Application(s) Topical <User Schedule>  dextrose 5%. 1000 milliLiter(s) (50 mL/Hr) IV Continuous <Continuous>  dextrose 5%. 1000 milliLiter(s) (100 mL/Hr) IV Continuous <Continuous>  dextrose 50% Injectable 25 Gram(s) IV Push once  dextrose 50% Injectable 12.5 Gram(s) IV Push once  dextrose 50% Injectable 25 Gram(s) IV Push once  enoxaparin Injectable 40 milliGRAM(s) SubCutaneous every 24 hours  glucagon  Injectable 1 milliGRAM(s) IntraMuscular once  insulin lispro (ADMELOG) corrective regimen sliding scale   SubCutaneous three times a day before meals  insulin lispro (ADMELOG) corrective regimen sliding scale   SubCutaneous at bedtime  polyethylene glycol 3350 17 Gram(s) Oral daily  senna 2 Tablet(s) Oral at bedtime    MEDICATIONS  (PRN):  acetaminophen     Tablet .. 650 milliGRAM(s) Oral every 6 hours PRN Mild Pain (1 - 3), Moderate Pain (4 - 6)  dextrose Oral Gel 15 Gram(s) Oral once PRN Blood Glucose LESS THAN 70 milliGRAM(s)/deciliter    CAPILLARY BLOOD GLUCOSE    POCT Blood Glucose.: 135 mg/dL (23 Jul 2023 09:16)  POCT Blood Glucose.: 155 mg/dL (22 Jul 2023 22:38)  POCT Blood Glucose.: 107 mg/dL (22 Jul 2023 18:15)    I&O's Summary    23 Jul 2023 07:01  -  23 Jul 2023 13:08  --------------------------------------------------------  IN: 100 mL / OUT: 0 mL / NET: 100 mL    PHYSICAL EXAM:  Vital Signs Last 24 Hrs  T(C): 36.9 (23 Jul 2023 09:15), Max: 37.2 (22 Jul 2023 18:53)  T(F): 98.4 (23 Jul 2023 09:15), Max: 98.9 (22 Jul 2023 18:53)  HR: 78 (23 Jul 2023 09:15) (77 - 97)  BP: 143/82 (23 Jul 2023 09:15) (143/82 - 158/87)  BP(mean): --  RR: 18 (23 Jul 2023 09:15) (17 - 18)  SpO2: 99% (23 Jul 2023 09:15) (95% - 100%)    Parameters below as of 23 Jul 2023 09:15  Patient On (Oxygen Delivery Method): room air    CONSTITUTIONAL: NAD, sitting up in bed  EYES: conjunctiva and sclera clear  ENMT: Moist oral mucosa  NECK: Supple  RESPIRATORY: Normal respiratory effort; lungs are clear to auscultation bilaterally  CARDIOVASCULAR: Regular rate and rhythm, normal S1 and S2, No lower extremity edema; Peripheral pulses are 2+ bilaterally  ABDOMEN: Nontender to palpation, normoactive bowel sounds, no rebound/guarding  PSYCH: calm, affect appropriate  NEUROLOGY: left hand with 3/5 strength, remainder of strength/sensation intact  SKIN: No rashes; no palpable lesions    LABS:                        14.2   5.80  )-----------( 242      ( 22 Jul 2023 05:40 )             44.3     07-22    139  |  105  |  20  ----------------------------<  139<H>  4.3   |  21<L>  |  0.70    Ca    9.3      22 Jul 2023 05:40  Phos  3.2     07-22  Mg     2.30     07-22    TPro  7.1  /  Alb  4.4  /  TBili  0.8  /  DBili  x   /  AST  17  /  ALT  23  /  AlkPhos  48  07-22    Urinalysis Basic - ( 22 Jul 2023 05:40 )    Color: x / Appearance: x / SG: x / pH: x  Gluc: 139 mg/dL / Ketone: x  / Bili: x / Urobili: x   Blood: x / Protein: x / Nitrite: x   Leuk Esterase: x / RBC: x / WBC x   Sq Epi: x / Non Sq Epi: x / Bacteria: x    RADIOLOGY & ADDITIONAL TESTS: Reviewed  COORDINATION OF CARE:  Care Discussed with Consultants/Other Providers [Y- Medicine ACP]

## 2023-07-23 NOTE — DISCHARGE NOTE PROVIDER - REASON FOR ADMISSION
Detail Level: Detailed Add 1585x Cpt? (Do Not Bill If You Billed For The Procedure Placing The Sutures. This Is An Add-On Code That Must Be Billed With An E/M Visit Code): No left upper extremity weakness

## 2023-07-23 NOTE — DISCHARGE NOTE NURSING/CASE MANAGEMENT/SOCIAL WORK - NSDCFUADDAPPT_GEN_ALL_CORE_FT
Follow up with Stroke NP, Beata Gallegos or Deanne Chi, in clinic at [address] [place] [address] by calling [phone] for an appointment.     Also follow up with your primary doctor and any other medical providers you see.

## 2023-07-23 NOTE — DISCHARGE NOTE NURSING/CASE MANAGEMENT/SOCIAL WORK - PATIENT PORTAL LINK FT
You can access the FollowMyHealth Patient Portal offered by [facility] by registering at the following website: [email or web address]. By joining [employer]’s FollowMyHealth portal, you will also be able to view your health information using other applications (apps) compatible with our system.

## 2023-07-23 NOTE — DISCHARGE NOTE PROVIDER - NSDCMRMEDTOKEN_GEN_ALL_CORE_FT
aspirin 81 mg oral tablet: 1 orally once a day  atorvastatin 20 mg oral tablet: 1 orally once a day  losartan 100 mg oral tablet: 1 orally once a day  Metformin 800 mg once daily:   metprolol 50 mg once daily:   Multivitamin:    aspirin 81 mg oral tablet: 1 orally once a day  atorvastatin 20 mg oral tablet: 1 tab(s) orally once a day  polyethylene glycol 3350 oral powder for reconstitution: 17 gram(s) orally once a day  senna leaf extract oral tablet: 2 tab(s) orally once a day (at bedtime)   aspirin 81 mg oral tablet: 1 orally once a day  atorvastatin 20 mg oral tablet: 1 tab(s) orally once a day  losartan 100 mg oral tablet: 1 tab(s) orally once a day  polyethylene glycol 3350 oral powder for reconstitution: 17 gram(s) orally once a day  senna leaf extract oral tablet: 2 tab(s) orally once a day (at bedtime)  Toprol-XL 50 mg oral tablet, extended release: 1 tab(s) orally once a day

## 2023-07-23 NOTE — PROGRESS NOTE ADULT - PROBLEM SELECTOR PLAN 2
SBP 140s recently ,per neuro goal 140-160s today, gradual normotension  resume BB, ARB prn
SBP 140s recently ,per neuro goal 140-160s today, gradual normotension  resume BB, ARB prn

## 2023-07-23 NOTE — DISCHARGE NOTE PROVIDER - NSDCFUADDAPPT_GEN_ALL_CORE_FT
Follow up with Stroke NP, Beata Gallegos or Deanne Chi, in clinic at [address] [place] [address] by calling [phone] for an appointment.     Also follow up with your primary doctor and any other medical providers you see.

## 2023-07-23 NOTE — PROGRESS NOTE ADULT - PROBLEM SELECTOR PLAN 1
Suspected possible stroke. S/p TPA. CTH without hemorrhage.   - F/u brain MRI- informed patient was scheduled for today  - Appreciate Neuro consult  - C/w asa 81, atorva 20  - PT/OT  - HA now resolved
Suspected possible stroke. S/p TPA. CTH without hemorrhage.   - F/u brain MRI  - Appreciate Neuro consult  - C/w asa 81, atorva 20  - PT/OT  - trial tylenol for HA. Low threshold for repeat CTH for worsening HA

## 2023-07-23 NOTE — DISCHARGE NOTE PROVIDER - DISCHARGE DIET
Low Sodium Diet/Consistent Carbohydrate Diabetic Diets DASH Diet/Consistent Carbohydrate Diabetic Diets

## 2023-07-23 NOTE — DISCHARGE NOTE PROVIDER - CARE PROVIDER_API CALL
Beata Gallegos  NP in Family Health  [address] [place] [address] [address]  [place] [address]  Phone: [phone]  Fax: [phone]  Follow Up Time:     Micheal Carrillo  Cardiovascular Disease  [address] [place] [id number]  Phone: [phone]  Fax: [phone]  Follow Up Time: 2 weeks

## 2023-07-23 NOTE — DISCHARGE NOTE PROVIDER - HOSPITAL COURSE
72 yo female w/ HTN, DM, presented to ED w/ LUE weakness, CTH neg for hemorrhage, s/p TPA and MICU monitoring, now downgraded to medicine floor. Continued LUE weakness, MRI head showed_________________________. Neurology advised _____________________. BP meds initially held per neuro for permissive HTN, on discharge BP meds were resumed. Patient to follow up with PCP, Neurology after discharge. 74 yo female w/ HTN, DM, presented to ED w/ LUE weakness, CTH neg for hemorrhage, s/p TPA and MICU monitoring, now downgraded to medicine floor. Continued LUE weakness, MRI head showed acute right frontal infarct.  Discussed with Neurology Attending Dr. Marie advising aspirin 81, atorvastin 20 on discharge with follow up in Neuro Stroke clinic. BP meds initially held per neuro for permissive HTN, on discharge BP meds were resumed. Patient to follow up with PCP, Neurology after discharge. 74 yo female w/ HTN, DM, presented to ED w/ LUE weakness, CTH neg for hemorrhage, s/p TPA and MICU monitoring, now downgraded to medicine floor. Continued LUE weakness, MRI head showed acute right frontal infarct.  Discussed with Neurology Attending Dr. Marie advising aspirin 81, atorvastatin 20 on discharge with follow up in Neuro Stroke clinic. BP meds initially held per neuro for permissive HTN, on discharge BP meds were resumed. Patient to follow up with PCP, Neurology after discharge. 74 yo female w/ HTN, DM, presented to ED w/ LUE weakness, CTH neg for hemorrhage, s/p TPA and MICU monitoring, now downgraded to medicine floor. Continued LUE weakness, MRI head showed acute right frontal infarct.  Discussed with Neurology Attending Dr. Marie advising aspirin 81, atorvastatin 20, and resume home bp meds on discharge with follow up in Neuro Stroke clinic. BP meds initially held per neuro for permissive HTN, on discharge BP meds were resumed. Patient to follow up with PCP, Neurology after discharge. 72 yo female w/ HTN, DM, presented to ED w/ LUE weakness, CTH neg for hemorrhage, s/p TPA and MICU monitoring, now downgraded to medicine floor. Continued LUE weakness, MRI head showed acute right frontal infarct.  Discussed with Neurology Attending Dr. Marie advising aspirin 81, atorvastatin 20, and resume home bp meds on discharge with follow up in Neuro Stroke clinic. BP meds initially held per neuro for permissive HTN, on discharge BP meds were resumed. Patient to follow up with PCP, Neurology after discharge. DC planning time 36 minutes.

## 2023-07-23 NOTE — DISCHARGE NOTE PROVIDER - NSDCCPCAREPLAN_GEN_ALL_CORE_FT
PRINCIPAL DISCHARGE DIAGNOSIS  Diagnosis: LUE weakness  Assessment and Plan of Treatment: You came to the hospital with left hand weakness. You were seen by Neurology and received a blood thinner called TPA to treat a potential ischemic stroke. You underwent an MRI which revelead ___________. Please follow up with your primary doctor and follow up with Neurology after hospital discharge.      SECONDARY DISCHARGE DIAGNOSES  Diagnosis: HTN (hypertension)  Assessment and Plan of Treatment: Please resume your blood pressure medications after hospital discharge, follow up with your primary doctor for a repeat blood pressure check.     PRINCIPAL DISCHARGE DIAGNOSIS  Diagnosis: LUE weakness  Assessment and Plan of Treatment: You came to the hospital with left hand weakness. You were seen by Neurology and received a blood thinner called TPA to treat a potential ischemic stroke. You underwent an MRI which revelead a stroke in the right part of your brain. Please follow up with your primary doctor and follow up with Neurology after hospital discharge.      SECONDARY DISCHARGE DIAGNOSES  Diagnosis: HTN (hypertension)  Assessment and Plan of Treatment: Please resume your blood pressure medications after hospital discharge, follow up with your primary doctor for a repeat blood pressure check.     PRINCIPAL DISCHARGE DIAGNOSIS  Diagnosis: LUE weakness  Assessment and Plan of Treatment: You came to the hospital with left hand weakness. You were seen by Neurology and received a blood thinner called TPA to treat a potential ischemic stroke. You underwent an MRI which revelead a stroke in the right part of your brain. Please follow up with your primary doctor and follow up with Neurology after hospital discharge.      SECONDARY DISCHARGE DIAGNOSES  Diagnosis: HTN (hypertension)  Assessment and Plan of Treatment: Please resume your home blood pressure medications after hospital discharge, follow up with your primary doctor for a repeat blood pressure check.    Diagnosis: DM (diabetes mellitus)  Assessment and Plan of Treatment: .Continue a consistent carbohydrate diet (Meaning eating the same amount of carbohydrates at the same time each day). Monitor blood glucose levels four times a day before meals and at bedtime. Record blood sugars and bring to outpatient providers appointment in order to be reviewed by your doctor for management modifications. If your sugars are more than 400 or less than 70 you should contact your Primary Care Physician immediately. Monitor for signs/symptoms of low blood glucose, such as, dizziness, altered mental status, or cool/clammy skin. In addition, monitor for signs/symptoms of high blood glucose, such as, feeling hot, dry, fatigued, or with increased thirst/urination.  Exercise daily for at least 30 minutes and weight loss.  Follow up with your primary care physician and endocrinologist for routine Hemoglobin A1C checks and management.  Follow up with your ophthalmologist for routine yearly vision exams. Make regular podiatry appointments in order to have feet checked for wounds.

## 2023-07-25 PROBLEM — Z86.69 PERSONAL HISTORY OF OTHER DISEASES OF THE NERVOUS SYSTEM AND SENSE ORGANS: Chronic | Status: ACTIVE | Noted: 2023-07-19

## 2023-07-25 PROBLEM — E11.9 TYPE 2 DIABETES MELLITUS WITHOUT COMPLICATIONS: Chronic | Status: ACTIVE | Noted: 2023-07-19

## 2023-08-07 ENCOUNTER — APPOINTMENT (OUTPATIENT)
Dept: NEUROLOGY | Facility: CLINIC | Age: 73
End: 2023-08-07
Payer: MEDICARE

## 2023-08-07 DIAGNOSIS — Z87.891 PERSONAL HISTORY OF NICOTINE DEPENDENCE: ICD-10-CM

## 2023-08-07 DIAGNOSIS — I63.9 CEREBRAL INFARCTION, UNSPECIFIED: ICD-10-CM

## 2023-08-07 PROCEDURE — 99204 OFFICE O/P NEW MOD 45 MIN: CPT

## 2023-08-07 RX ORDER — METOPROLOL SUCCINATE 50 MG/1
50 TABLET, EXTENDED RELEASE ORAL
Refills: 0 | Status: ACTIVE | COMMUNITY

## 2023-08-07 RX ORDER — LOSARTAN POTASSIUM 100 MG/1
100 TABLET, FILM COATED ORAL
Refills: 0 | Status: ACTIVE | COMMUNITY

## 2023-08-07 RX ORDER — ASPIRIN 81 MG/1
81 TABLET, COATED ORAL
Refills: 0 | Status: ACTIVE | COMMUNITY

## 2023-08-07 RX ORDER — ATORVASTATIN CALCIUM 40 MG/1
40 TABLET, FILM COATED ORAL
Refills: 0 | Status: ACTIVE | COMMUNITY

## 2023-08-07 NOTE — DISCUSSION/SUMMARY
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 CTA revealed mild stenosis at the proximal right internal carotid artery of approximately 60% by NASCET criteria but carotid Dopplers noted mild R ICA stenosis (especially when considering the velocities) <49%  ** Will review whether or not carotid stenosis is symptomatic with stroke team   Overall patient is neurologically stable. Continue ASA 81 mg once daily for secondary stroke reduction. Continue to follow up with PCP/cardiology for BP and statin management (goal LDL <70) as well as all routine primary care needs. Need for prolonged cardiac monitoring to be discussed with team.  Screened patient for sleep apnea with no identifiable risk factors at this time. Will reevaluate next visit. We discussed aggressive vascular strict risk factor control.  Follow up with me in 1-2 months and stroke MD at next available. Transcranial and Carotid Doppler's to be obtained at next appointment. Patient and family were educated on signs and symptoms of stroke and to contact 911 immediately if experiencing any.  [Antithrombotic therapy with ___] : antithrombotic therapy with  [unfilled] [Intensive Blood Pressure Control] : intensive blood pressure control [Lipid Lowering Therapy] : lipid lowering therapy [Patient encouraged to discuss with Primary MD] : I encouraged the patient to discuss these important issues with ~his/her~ primary care doctor [Goals and Counseling] : I have reviewed the goals of stroke risk factor modification. I counseled the patient on measures to reduce stroke risk, including the importance of medication compliance, risk factor control, exercise, healthy diet and avoidance of smoking. I reviewed stroke warning signs and symptoms and appropriate actions to take if such occur.

## 2023-08-07 NOTE — HISTORY OF PRESENT ILLNESS
[FreeTextEntry1] : Ms. Holt is a 73 year old left handed female PMHx HTN, DM, migraines who presents today for post hospitalization follow up after an Acute right frontal lobe infarction on 7/19/2023   73y (1950) wo/man with a PMHx significant for HTN, DM, migraines, TIA last week  presents to ED as a code stroke for LUE numbness and distal LUE weakness. LKW 3:30 PM 7/19/23. Pt folding a box around 3:30 PM when she noted sudden on weakness in her LUE. Pt states she took one baby ASA during that time. Still with symptoms in ED.  CT negative for hemorrhage. Pt received tenecteplase at 5:40 PM today given disabling symptom, after the contraindications were reviewed- did not meet any contraindications. Pt was explained the risks/benefits of the medication (prior to her receiving it) and pt agreed for the treatment. rCTHs stable, questionable R perirolandic hypodensities; MRI brain w/o contrast revealed an acute right frontal lobe infarction. TTE negative for embolic source. Carotid Dopplers demonstrate only mild R ICA stenosis (especially when considering the velocities).   Today she reports feeling back to baseline. She notes compliance with meds. She has been on ASA daily for about 10 years.

## 2023-08-07 NOTE — PHYSICAL EXAM
[FreeTextEntry1] : General: Constitutional: Female, appears stated age, in no apparent distress including pain Head: Normocephalic & Atraumatic. Respiratory: Breathing comfortably.  Neurological: MS: Eyes open. Awake, alert, oriented to person, place, situation, time. Follows all commands, including crossed commands.   Language: Speech is fluent with good repetition & comprehension.  CNs: VFF. EOMI no nystagmus. V1-3 intact to LT b/l. No facial asymmetry b/l, full eye closure strength b/l. No dysarthria.  Motor: Normal muscle bulk & tone. No noticeable tremor. No drift of UE or LE b/l. No pronator drift.              Deltoid	Biceps	Triceps	  Wrist Ext  Finger Abd. R	   5	  5	     5	 5	  5	    5	 	 L	   5	  5	     5	 4-	  4- 	    3   	  	H-Flex	K-Flex	K-Ext	D-Flex	P-Flex R	    5	  5	  5	   5	    5		  L	    5	  5	  5	   5	    5		   Sensation: Intact to LT b/l throughout.   Cortical: Extinction on DSS (neglect): none  Coordination: No dysmetria to FTN b/l.   Gait: slow and steady gait

## 2023-09-13 ENCOUNTER — APPOINTMENT (OUTPATIENT)
Dept: NEUROLOGY | Facility: CLINIC | Age: 73
End: 2023-09-13
Payer: MEDICARE

## 2023-09-13 PROCEDURE — 93892 TCD EMBOLI DETECT W/O INJ: CPT

## 2023-09-13 PROCEDURE — 93880 EXTRACRANIAL BILAT STUDY: CPT

## 2023-09-13 PROCEDURE — 93886 INTRACRANIAL COMPLETE STUDY: CPT

## 2023-11-09 ENCOUNTER — APPOINTMENT (OUTPATIENT)
Dept: NEUROLOGY | Facility: CLINIC | Age: 73
End: 2023-11-09

## 2024-01-03 ENCOUNTER — NON-APPOINTMENT (OUTPATIENT)
Age: 74
End: 2024-01-03

## 2024-01-16 ENCOUNTER — APPOINTMENT (OUTPATIENT)
Dept: NEUROLOGY | Facility: CLINIC | Age: 74
End: 2024-01-16

## 2025-06-27 NOTE — STROKE CODE NOTE - [facility] STROKE SCALE DATE
[facility] CONCERNS ON [facility] FOR APPT SCHED 07/01/25. PT REPORTS THAT EFF 07/01/25 [facility] WILL STILL BE [facility] BUT JUST A DIFFERENT PLAN OF [employer]. SENT MESSAGE TO [facility] TO ADV OF CHANGE TO COME AND RTN'D PC TO PT TO ADV SINCE CHANGES DO NOT OCCUR UNTIL 07/01; WILL HAVE TO RUN [employer] THAT MORNING FOR AFTERNOON APPT AND IF SHE QUACH SNOT GET A CALL BY 1P TO PLEASE CALL US AND CHECK FOR ANY NEW UPDATES FOR APPT SCHED SAME DATE  
19-Jul-2023 17:05